# Patient Record
Sex: FEMALE | Race: WHITE | NOT HISPANIC OR LATINO | URBAN - METROPOLITAN AREA
[De-identification: names, ages, dates, MRNs, and addresses within clinical notes are randomized per-mention and may not be internally consistent; named-entity substitution may affect disease eponyms.]

---

## 2017-05-11 ENCOUNTER — APPOINTMENT (OUTPATIENT)
Dept: URBAN - METROPOLITAN AREA CLINIC 200 | Age: 62
Setting detail: DERMATOLOGY
End: 2017-05-17

## 2017-05-11 DIAGNOSIS — L82.0 INFLAMED SEBORRHEIC KERATOSIS: ICD-10-CM

## 2017-05-11 DIAGNOSIS — L57.8 OTHER SKIN CHANGES DUE TO CHRONIC EXPOSURE TO NONIONIZING RADIATION: ICD-10-CM

## 2017-05-11 PROCEDURE — 99213 OFFICE O/P EST LOW 20 MIN: CPT

## 2017-05-11 PROCEDURE — OTHER COUNSELING: OTHER

## 2017-05-11 ASSESSMENT — LOCATION SIMPLE DESCRIPTION DERM
LOCATION SIMPLE: RIGHT SHOULDER
LOCATION SIMPLE: RIGHT UPPER BACK

## 2017-05-11 ASSESSMENT — LOCATION ZONE DERM
LOCATION ZONE: ARM
LOCATION ZONE: TRUNK

## 2017-05-11 ASSESSMENT — LOCATION DETAILED DESCRIPTION DERM
LOCATION DETAILED: RIGHT ANTERIOR SHOULDER
LOCATION DETAILED: RIGHT MEDIAL UPPER BACK

## 2018-05-15 ENCOUNTER — APPOINTMENT (OUTPATIENT)
Dept: URBAN - METROPOLITAN AREA CLINIC 200 | Age: 63
Setting detail: DERMATOLOGY
End: 2018-05-18

## 2018-05-15 DIAGNOSIS — L57.8 OTHER SKIN CHANGES DUE TO CHRONIC EXPOSURE TO NONIONIZING RADIATION: ICD-10-CM

## 2018-05-15 DIAGNOSIS — L81.1 CHLOASMA: ICD-10-CM

## 2018-05-15 PROCEDURE — 99213 OFFICE O/P EST LOW 20 MIN: CPT

## 2018-05-15 PROCEDURE — OTHER PRESCRIPTION: OTHER

## 2018-05-15 PROCEDURE — OTHER COUNSELING: OTHER

## 2018-05-15 RX ORDER — HYDROQUINONE 4 %
CREAM (GRAM) TOPICAL
Qty: 1 | Refills: 6

## 2018-05-15 ASSESSMENT — LOCATION SIMPLE DESCRIPTION DERM
LOCATION SIMPLE: LEFT LIP
LOCATION SIMPLE: CHEST

## 2018-05-15 ASSESSMENT — LOCATION ZONE DERM
LOCATION ZONE: LIP
LOCATION ZONE: TRUNK

## 2018-05-15 ASSESSMENT — LOCATION DETAILED DESCRIPTION DERM
LOCATION DETAILED: LEFT MEDIAL SUPERIOR CHEST
LOCATION DETAILED: LEFT LOWER CUTANEOUS LIP

## 2018-05-15 ASSESSMENT — SEVERITY ASSESSMENT: SEVERITY: MODERATE, MODERATELY DARKER THAN THE SURROUNDING NORMAL SKIN

## 2018-08-06 ENCOUNTER — TRANSCRIBE ORDERS (OUTPATIENT)
Dept: RADIOLOGY | Age: 63
End: 2018-08-06

## 2018-08-06 ENCOUNTER — HOSPITAL ENCOUNTER (OUTPATIENT)
Dept: RADIOLOGY | Age: 63
Discharge: HOME | End: 2018-08-06
Attending: INTERNAL MEDICINE
Payer: COMMERCIAL

## 2018-08-06 DIAGNOSIS — Z12.31 ENCOUNTER FOR SCREENING MAMMOGRAM FOR MALIGNANT NEOPLASM OF BREAST: Primary | ICD-10-CM

## 2018-08-06 DIAGNOSIS — Z12.31 ENCOUNTER FOR SCREENING MAMMOGRAM FOR MALIGNANT NEOPLASM OF BREAST: ICD-10-CM

## 2018-08-06 PROCEDURE — 77063 BREAST TOMOSYNTHESIS BI: CPT

## 2018-09-17 ENCOUNTER — OFFICE VISIT (OUTPATIENT)
Dept: ENDOCRINOLOGY | Facility: CLINIC | Age: 63
End: 2018-09-17
Payer: COMMERCIAL

## 2018-09-17 VITALS
HEART RATE: 70 BPM | SYSTOLIC BLOOD PRESSURE: 118 MMHG | DIASTOLIC BLOOD PRESSURE: 84 MMHG | BODY MASS INDEX: 33.97 KG/M2 | HEIGHT: 56 IN | RESPIRATION RATE: 16 BRPM | WEIGHT: 151 LBS

## 2018-09-17 DIAGNOSIS — E11.9 DIABETES MELLITUS, STABLE (CMS/HCC): Primary | ICD-10-CM

## 2018-09-17 DIAGNOSIS — E11.9 TYPE 2 DIABETES MELLITUS WITHOUT COMPLICATION, WITHOUT LONG-TERM CURRENT USE OF INSULIN (CMS/HCC): ICD-10-CM

## 2018-09-17 DIAGNOSIS — E66.09 CLASS 1 OBESITY DUE TO EXCESS CALORIES WITHOUT SERIOUS COMORBIDITY IN ADULT, UNSPECIFIED BMI: ICD-10-CM

## 2018-09-17 DIAGNOSIS — E66.811 CLASS 1 OBESITY DUE TO EXCESS CALORIES WITHOUT SERIOUS COMORBIDITY IN ADULT, UNSPECIFIED BMI: ICD-10-CM

## 2018-09-17 PROBLEM — J45.20 MILD INTERMITTENT ASTHMA WITHOUT COMPLICATION: Status: ACTIVE | Noted: 2018-09-17

## 2018-09-17 PROCEDURE — 99245 OFF/OP CONSLTJ NEW/EST HI 55: CPT | Performed by: NURSE PRACTITIONER

## 2018-09-17 RX ORDER — ACETAMINOPHEN 500 MG
5000 TABLET ORAL DAILY
COMMUNITY

## 2018-09-17 RX ORDER — IBUPROFEN 200 MG
16 TABLET ORAL AS NEEDED
COMMUNITY

## 2018-09-17 RX ORDER — METFORMIN HYDROCHLORIDE 500 MG/1
500 TABLET ORAL
COMMUNITY
Start: 2015-08-03 | End: 2018-12-18 | Stop reason: SDUPTHER

## 2018-09-17 RX ORDER — RIBOFLAVIN (VITAMIN B2) 100 MG
TABLET ORAL DAILY
COMMUNITY

## 2018-09-17 RX ORDER — LISINOPRIL 2.5 MG/1
1 TABLET ORAL
COMMUNITY
Start: 2018-02-10 | End: 2019-07-25 | Stop reason: SDUPTHER

## 2018-09-17 NOTE — PROGRESS NOTES
Endocrinology  Note       PATIENTS: Edd Hammond  YOB: 1955  DATE: September 17, 2018  VISIT TYPE: follow up visit    History of Present Illness:   HPI 63 year old white female with type 2 diabetes since 2011.  She comes to the practice for diabetes management.  She states on diagnoses her A1c was 7.1.  She was placed on Metformin.  The patient was successful in losing 50 pounds in 2015 though she states she has regained 15 pounds.  She is currently on metformin 500 mg 2 tabs daily.  Her fasting blood sugars are .  She reports no hypoglycemia.  She does have a past medical history of sleep apnea she cannot use her CPAP asthma and  hypertension.  She does not smoke she is a  she works from her home she is  with no children no pregnancies.  Her past surgical history includes bilateral carpal tunnel release left shoulder repair colonoscopy she is now working with orthopedics for right shoulder discomfort.  She was last seen by ophthalmology in 2018 and podiatry most recently.    Medical History:  Past Medical History:   Diagnosis Date   • Arthritis    • Asthma    • Diverticulitis of colon    • High cholesterol    • Hypertension    • Type 2 diabetes mellitus (CMS/HCC) ([facility])        Surgical History:   History reviewed. No pertinent surgical history.    Family History:  Family History   Problem Relation Age of Onset   • COPD Mother    • Lung cancer Father        Social history:  Social History     Social History   • Marital status:      Spouse name: N/A   • Number of children: N/A   • Years of education: N/A     Occupational History   • Not on file.     Social History Main Topics   • Smoking status: Never Smoker   • Smokeless tobacco: Never Used   • Alcohol use Yes   • Drug use: No   • Sexual activity: Not on file     Other Topics Concern   • Not on file     Social History Narrative   • No narrative on file       Medication(active prior to  "today):  Current Outpatient Prescriptions   Medication Sig Dispense Refill   • cholecalciferol, vitamin D3, 5,000 unit tablet Take 5,000 Units by mouth daily.     • glucose 4 gram chewable tablet Take 16 g by mouth as needed for low blood sugar.     • lisinopril (PRINIVIL) 2.5 mg tablet Take 1 tablet by mouth.     • metFORMIN (GLUCOPHAGE) 500 mg tablet 500 mg.     • multivitamin tablet Take 1 tablet by mouth daily.     • riboflavin, vitamin B2, 100 mg tablet Take by mouth daily.       No current facility-administered medications for this visit.        Allergies:  Erythromycin    Review of Systems:   Constitutional: Negative for fatigue and unexpected weight change. + weight gain   HENT: Negative for postnasal drip, trouble swallowing and voice change.    Eyes: Negative for visual disturbance.   Respiratory: +  for apnea.  + asthma   Cardiovascular: Negative for leg swelling.   Gastrointestinal: +  for constipation, diarrhea and nausea.   Endocrine: Negative for polydipsia, polyphagia and polyuria.   Musculoskeletal: +  for arthralgias, back pain, neck pain and neck stiffness.   Neurological: Negative for numbness and headaches. + sinus headaches   Psychiatric/Behavioral: Negative for sleep disturbance. + depression     Vitals Signs:  Vitals:    09/17/18 1343   BP: 118/84   Pulse: 70   Resp: 16   Weight: 68.5 kg (151 lb)   Height: 1.422 m (4' 8\")     Body mass index is 33.85 kg/m².      Physical Exam:  Constitutional: Patient is oriented to person, place, and time.  Appears well-developed and well-nourished.   Eyes: EOM are normal. Pupils are equal, round, and reactive to light.   Neck: Normal range of motion. Neck supple.   Cardiovascular: Normal rate, regular rhythm and normal heart sounds.    Pulmonary/Chest: Effort normal and breath sounds normal.   Abdominal: Soft.   Musculoskeletal: Normal range of motion.   Neurological:  Alert and oriented to person, place, and time.   Skin: Skin is warm and dry. "   Psychiatric:  Normal mood and affect. Behavior is normal. Judgment and thought content normal.   Foot Exam: Normal exam, no swelling, tenderness, instability; ligaments intact, full range of motion of all ankle/foot joints    Assessment/Plan:  Diabetes mellitus, stable (CMS/HCC) ([facility])  1.  A1c6.3  2. Exercise goal 30 min 5 days per week   3. Only diet drinks/water   4. Call me for any steroids  5. Continue with Metformin  mg per day   6. Bring glucometer next visit   7. Based on labs dertermin statin   8. See dietician     Obesity due to excess calories without serious comorbidity  Goal 10, 000 steps per day   Only diet drinks       Labs:  Lab Results   Component Value Date    WBC 6.91 09/01/2016    HGB 12.2 09/01/2016    HCT 35.8 09/01/2016     09/01/2016    ALT 18 09/01/2016    AST 21 09/01/2016     09/01/2016    K 4.0 09/01/2016     09/01/2016    CREATININE 0.9 09/01/2016    BUN 18 09/01/2016    CO2 28 09/01/2016       Total encounter time 60 minutes greater than 50% spent counseling/coordination of care.  I reviewed the patient's blood sugars.  I discussed the goals of diet and exercise with the patient. I discussed the goals of A1c, blood pressure, and cholesterol. I discussed the goals of blood sugar pre- and post meals with the patient.  I reviewed the patient's labs/imaging/medications/allergies/problem list.       Electronically signed by: JUSTO Yarbrough on 9/17/2018 4:45 PM

## 2018-09-17 NOTE — ASSESSMENT & PLAN NOTE
1.  A1c6.3  2. Exercise goal 30 min 5 days per week   3. Only diet drinks/water   4. Call me for any steroids  5. Continue with Metformin  mg per day   6. Bring glucometer next visit   7. Based on labs dertermin statin   8. See dietician

## 2018-09-17 NOTE — PATIENT INSTRUCTIONS
Diabetes mellitus, stable (CMS/[facility]) ([facility])  1.  A1c6.3  2. Exercise goal 30 min 5 days per week   3. Only diet drinks/water   4. Call me for any steroids  5. Continue with Metformin  mg per day   6. Bring glucometer next visit   7. Based on labs dertermin statin   8. See dietician     Obesity due to excess calories without serious comorbidity  Goal 10, 000 steps per day   Only diet drinks     Patient Education     Diabetes Mellitus and Exercise  Exercising regularly is important for your overall health, especially when you have diabetes (diabetes mellitus). Exercising is not only about losing weight. It has many health benefits, such as increasing muscle strength and bone density and reducing body fat and stress. This leads to improved fitness, flexibility, and endurance, all of which result in better overall health.  Exercise has additional benefits for people with diabetes, including:  · Reducing appetite.  · Helping to lower and control blood glucose.  · Lowering blood pressure.  · Helping to control amounts of fatty substances (lipids) in the blood, such as cholesterol and triglycerides.  · Helping the body to respond better to insulin (improving insulin sensitivity).  · Reducing how much insulin the body needs.  · Decreasing the risk for heart disease by:  ¨ Lowering cholesterol and triglyceride levels.  ¨ Increasing the levels of good cholesterol.  ¨ Lowering blood glucose levels.  What is my activity plan?  Your health care provider or certified diabetes educator can help you make a plan for the type and frequency of exercise (activity plan) that works for you. Make sure that you:  · Do at least 150 minutes of moderate-intensity or vigorous-intensity exercise each week. This could be brisk walking, biking, or water aerobics.  ¨ Do stretching and strength exercises, such as yoga or weightlifting, at least 2 times a week.  ¨ Spread out your activity over at least 3 days of the week.  · Get some form of  physical activity every day.  ¨ Do not go more than 2 days in a row without some kind of physical activity.  ¨ Avoid being inactive for more than 90 minutes at a time. Take frequent breaks to walk or stretch.  · Choose a type of exercise or activity that you enjoy, and set realistic goals.  · Start slowly, and gradually increase the intensity of your exercise over time.  What do I need to know about managing my diabetes?  · Check your blood glucose before and after exercising.  ¨ If your blood glucose is higher than 240 mg/dL (13.3 mmol/L) before you exercise, check your urine for ketones. If you have ketones in your urine, do not exercise until your blood glucose returns to normal.  · Know the symptoms of low blood glucose (hypoglycemia) and how to treat it. Your risk for hypoglycemia increases during and after exercise. Common symptoms of hypoglycemia can include:  ¨ Hunger.  ¨ Anxiety.  ¨ Sweating and feeling clammy.  ¨ Confusion.  ¨ Dizziness or feeling light-headed.  ¨ Increased heart rate or palpitations.  ¨ Blurry vision.  ¨ Tingling or numbness around the mouth, lips, or tongue.  ¨ Tremors or shakes.  ¨ Irritability.  · Keep a rapid-acting carbohydrate snack available before, during, and after exercise to help prevent or treat hypoglycemia.  · Avoid injecting insulin into areas of the body that are going to be exercised. For example, avoid injecting insulin into:  ¨ The arms, when playing tennis.  ¨ The legs, when jogging.  · Keep records of your exercise habits. Doing this can help you and your health care provider adjust your diabetes management plan as needed. Write down:  ¨ Food that you eat before and after you exercise.  ¨ Blood glucose levels before and after you exercise.  ¨ The type and amount of exercise you have done.  ¨ When your insulin is expected to peak, if you use insulin. Avoid exercising at times when your insulin is peaking.  · When you start a new exercise or activity, work with your  health care provider to make sure the activity is safe for you, and to adjust your insulin, medicines, or food intake as needed.  · Drink plenty of water while you exercise to prevent dehydration or heat stroke. Drink enough fluid to keep your urine clear or pale yellow.  This information is not intended to replace advice given to you by your health care provider. Make sure you discuss any questions you have with your health care provider.  Document Released: 03/09/2005 Document Revised: 07/07/2017 Document Reviewed: 05/29/2017  [employer] Interactive Patient Education © 2018 [employer]

## 2018-12-04 LAB
ALBUMIN SERPL-MCNC: 4.5 G/DL (ref 3.6–4.8)
ALBUMIN/CREAT UR: 27.8 MG/G CREAT (ref 0–30)
ALBUMIN/GLOB SERPL: 1.9 {RATIO} (ref 1.2–2.2)
ALP SERPL-CCNC: 83 IU/L (ref 39–117)
ALT SERPL-CCNC: 15 IU/L (ref 0–32)
AST SERPL-CCNC: 15 IU/L (ref 0–40)
BILIRUB SERPL-MCNC: 0.3 MG/DL (ref 0–1.2)
BUN SERPL-MCNC: 17 MG/DL (ref 8–27)
BUN/CREAT SERPL: 20 (ref 12–28)
CALCIUM SERPL-MCNC: 9.2 MG/DL (ref 8.7–10.3)
CHLORIDE SERPL-SCNC: 100 MMOL/L (ref 96–106)
CHOLEST SERPL-MCNC: 233 MG/DL (ref 100–199)
CO2 SERPL-SCNC: 25 MMOL/L (ref 20–29)
CREAT SERPL-MCNC: 0.87 MG/DL (ref 0.57–1)
CREAT UR-MCNC: 113.2 MG/DL
GLOBULIN SER CALC-MCNC: 2.4 G/DL (ref 1.5–4.5)
GLUCOSE SERPL-MCNC: 117 MG/DL (ref 65–99)
HBA1C MFR BLD: 6.3 % (ref 4.8–5.6)
HDLC SERPL-MCNC: 53 MG/DL
LAB CORP EGFR IF AFRICN AM: 82 ML/MIN/1.73
LAB CORP EGFR IF NONAFRICN AM: 71 ML/MIN/1.73
LDLC SERPL CALC-MCNC: 138 MG/DL (ref 0–99)
MICROALBUMIN UR-MCNC: 31.5 UG/ML
POTASSIUM SERPL-SCNC: 4.6 MMOL/L (ref 3.5–5.2)
PROT SERPL-MCNC: 6.9 G/DL (ref 6–8.5)
SODIUM SERPL-SCNC: 141 MMOL/L (ref 134–144)
T4 FREE SERPL-MCNC: 1.4 NG/DL (ref 0.82–1.77)
TRIGL SERPL-MCNC: 212 MG/DL (ref 0–149)
TSH SERPL DL<=0.005 MIU/L-ACNC: 2.13 UIU/ML (ref 0.45–4.5)
VLDLC SERPL CALC-MCNC: 42 MG/DL (ref 5–40)

## 2018-12-18 ENCOUNTER — OFFICE VISIT (OUTPATIENT)
Dept: ENDOCRINOLOGY | Facility: CLINIC | Age: 63
End: 2018-12-18
Payer: COMMERCIAL

## 2018-12-18 VITALS
BODY MASS INDEX: 34.55 KG/M2 | DIASTOLIC BLOOD PRESSURE: 82 MMHG | HEART RATE: 85 BPM | WEIGHT: 153.6 LBS | HEIGHT: 56 IN | SYSTOLIC BLOOD PRESSURE: 122 MMHG

## 2018-12-18 DIAGNOSIS — E11.9 DIABETES MELLITUS, STABLE (CMS/HCC): Primary | ICD-10-CM

## 2018-12-18 DIAGNOSIS — E78.49 OTHER HYPERLIPIDEMIA: ICD-10-CM

## 2018-12-18 PROCEDURE — 99214 OFFICE O/P EST MOD 30 MIN: CPT | Performed by: NURSE PRACTITIONER

## 2018-12-18 RX ORDER — BLOOD-GLUCOSE METER
1 KIT MISCELLANEOUS 4 TIMES DAILY
Qty: 200 STRIP | Refills: 11 | Status: SHIPPED | OUTPATIENT
Start: 2018-12-18 | End: 2019-12-31 | Stop reason: SDUPTHER

## 2018-12-18 RX ORDER — METFORMIN HYDROCHLORIDE 500 MG/1
500 TABLET ORAL 2 TIMES DAILY WITH MEALS
Qty: 60 TABLET | Refills: 3 | Status: SHIPPED | OUTPATIENT
Start: 2018-12-18 | End: 2019-07-25 | Stop reason: SDUPTHER

## 2018-12-18 NOTE — PATIENT INSTRUCTIONS
Diabetes mellitus, stable (CMS/[facility]) ([facility])  1. A1c 6.3  2. Continue with Metformin 500 mg 2 tabs daily   3. Continue with diet and exercise  4. Call me for any low blood sugars       Obesity due to excess calories without serious comorbidity  Continue with diet and exercise     Other hyperlipidemia  Based on labs may start statin     Patient Education     Diabetes Mellitus and Exercise  Exercising regularly is important for your overall health, especially when you have diabetes (diabetes mellitus). Exercising is not only about losing weight. It has many health benefits, such as increasing muscle strength and bone density and reducing body fat and stress. This leads to improved fitness, flexibility, and endurance, all of which result in better overall health.  Exercise has additional benefits for people with diabetes, including:  · Reducing appetite.  · Helping to lower and control blood glucose.  · Lowering blood pressure.  · Helping to control amounts of fatty substances (lipids) in the blood, such as cholesterol and triglycerides.  · Helping the body to respond better to insulin (improving insulin sensitivity).  · Reducing how much insulin the body needs.  · Decreasing the risk for heart disease by:  ¨ Lowering cholesterol and triglyceride levels.  ¨ Increasing the levels of good cholesterol.  ¨ Lowering blood glucose levels.  What is my activity plan?  Your health care provider or certified diabetes educator can help you make a plan for the type and frequency of exercise (activity plan) that works for you. Make sure that you:  · Do at least 150 minutes of moderate-intensity or vigorous-intensity exercise each week. This could be brisk walking, biking, or water aerobics.  ¨ Do stretching and strength exercises, such as yoga or weightlifting, at least 2 times a week.  ¨ Spread out your activity over at least 3 days of the week.  · Get some form of physical activity every day.  ¨ Do not go more than 2 days in a row  without some kind of physical activity.  ¨ Avoid being inactive for more than 90 minutes at a time. Take frequent breaks to walk or stretch.  · Choose a type of exercise or activity that you enjoy, and set realistic goals.  · Start slowly, and gradually increase the intensity of your exercise over time.  What do I need to know about managing my diabetes?  · Check your blood glucose before and after exercising.  ¨ If your blood glucose is higher than 240 mg/dL (13.3 mmol/L) before you exercise, check your urine for ketones. If you have ketones in your urine, do not exercise until your blood glucose returns to normal.  · Know the symptoms of low blood glucose (hypoglycemia) and how to treat it. Your risk for hypoglycemia increases during and after exercise. Common symptoms of hypoglycemia can include:  ¨ Hunger.  ¨ Anxiety.  ¨ Sweating and feeling clammy.  ¨ Confusion.  ¨ Dizziness or feeling light-headed.  ¨ Increased heart rate or palpitations.  ¨ Blurry vision.  ¨ Tingling or numbness around the mouth, lips, or tongue.  ¨ Tremors or shakes.  ¨ Irritability.  · Keep a rapid-acting carbohydrate snack available before, during, and after exercise to help prevent or treat hypoglycemia.  · Avoid injecting insulin into areas of the body that are going to be exercised. For example, avoid injecting insulin into:  ¨ The arms, when playing tennis.  ¨ The legs, when jogging.  · Keep records of your exercise habits. Doing this can help you and your health care provider adjust your diabetes management plan as needed. Write down:  ¨ Food that you eat before and after you exercise.  ¨ Blood glucose levels before and after you exercise.  ¨ The type and amount of exercise you have done.  ¨ When your insulin is expected to peak, if you use insulin. Avoid exercising at times when your insulin is peaking.  · When you start a new exercise or activity, work with your health care provider to make sure the activity is safe for you, and to  adjust your insulin, medicines, or food intake as needed.  · Drink plenty of water while you exercise to prevent dehydration or heat stroke. Drink enough fluid to keep your urine clear or pale yellow.  This information is not intended to replace advice given to you by your health care provider. Make sure you discuss any questions you have with your health care provider.  Document Released: 03/09/2005 Document Revised: 07/07/2017 Document Reviewed: 05/29/2017  [employer] Interactive Patient Education © 2018 [employer]

## 2018-12-18 NOTE — ASSESSMENT & PLAN NOTE
1. A1c 6.3  2. Continue with Metformin 500 mg 2 tabs daily   3. Continue with diet and exercise  4. Call me for any low blood sugars

## 2018-12-18 NOTE — PROGRESS NOTES
Endocrinology  Note       PATIENTS: Edd Hammond  YOB: 1955  DATE: December 18, 2018  VISIT TYPE: follow up visit    History of Present Illness:   HPI 63 year old white female with type 2 diabetes since 2011. She states she is upset not been able to lose weight recently she has been bothered by hip shoulder discomfort and will be meeting with orthopedist the end of this month     She is currently on metformin 500 mg 2 tabs daily.  Her fasting blood sugars are .  She reports no hypoglycemia.  She does have a past medical history of sleep apnea she cannot use her CPAP asthma and  hypertension.  She does not smoke she is a  she works from her home she is  with no children no pregnancies.  Her past surgical history includes bilateral carpal tunnel release left shoulder repair colonoscopy she is now working with orthopedics for right shoulder discomfort.  She was last seen by ophthalmology in 2018 and podiatry most recently.    Medical History:  Past Medical History:   Diagnosis Date   • Arthritis    • Asthma    • Diverticulitis of colon    • High cholesterol    • Hypertension    • Type 2 diabetes mellitus (CMS/HCC) ([facility])        Surgical History:   No past surgical history on file.    Family History:  Family History   Problem Relation Age of Onset   • COPD Mother    • Lung cancer Father        Social history:  Social History     Social History   • Marital status:      Spouse name: N/A   • Number of children: N/A   • Years of education: N/A     Occupational History   • Not on file.     Social History Main Topics   • Smoking status: Never Smoker   • Smokeless tobacco: Never Used   • Alcohol use Yes   • Drug use: No   • Sexual activity: Not on file     Other Topics Concern   • Not on file     Social History Narrative   • No narrative on file       Medication(active prior to today):  Current Outpatient Prescriptions   Medication Sig Dispense Refill   • RED  "YEAST RICE ORAL Take by mouth.     • cholecalciferol, vitamin D3, 5,000 unit tablet Take 5,000 Units by mouth daily.     • FREESTYLE LITE STRIPS strip 1 strip 4 (four) times a day. Dx code E 11.9 200 strip 11   • glucose 4 gram chewable tablet Take 16 g by mouth as needed for low blood sugar.     • lisinopril (PRINIVIL) 2.5 mg tablet Take 1 tablet by mouth.     • metFORMIN (GLUCOPHAGE) 500 mg tablet Take 1 tablet (500 mg total) by mouth 2 (two) times a day with meals. 60 tablet 3   • multivitamin tablet Take 1 tablet by mouth daily.     • riboflavin, vitamin B2, 100 mg tablet Take by mouth daily.       No current facility-administered medications for this visit.        Allergies:  Erythromycin    Review of Systems:   Constitutional: Negative for fatigue and unexpected weight change.   HENT: Negative for postnasal drip, trouble swallowing and voice change.    Eyes: Negative for visual disturbance.   Respiratory: Negative for apnea.    Cardiovascular: Negative for leg swelling.   Gastrointestinal: Negative for constipation, diarrhea and nausea.   Endocrine: Negative for polydipsia, polyphagia and polyuria.   Musculoskeletal: +  for arthralgias, back pain, neck pain and neck stiffness.   Neurological: Negative for numbness and headaches.   Psychiatric/Behavioral: Negative for sleep disturbance.     Vitals Signs:  Vitals:    12/18/18 1348   BP: 122/82   BP Location: Right upper arm   Patient Position: Sitting   Pulse: 85   Weight: 69.7 kg (153 lb 9.6 oz)   Height: 1.422 m (4' 8\")     Body mass index is 34.44 kg/m².      Physical Exam:  Constitutional: Patient is oriented to person, place, and time.  Appears well-developed and well-nourished. + overweight   Eyes: EOM are normal. Pupils are equal, round, and reactive to light.   Neck: Normal range of motion. Neck supple.   Cardiovascular: Normal rate, regular rhythm and normal heart sounds.    Pulmonary/Chest: Effort normal and breath sounds normal.   Abdominal: Soft. "   Musculoskeletal: Normal range of motion.   Neurological:  Alert and oriented to person, place, and time.   Skin: Skin is warm and dry.   Psychiatric:  Normal mood and affect. Behavior is normal. Judgment and thought content normal.   Foot Exam: Normal exam, no swelling, tenderness, instability; ligaments intact, full range of motion of all ankle/foot joints    Assessment/Plan:  Diabetes mellitus, stable (CMS/HCC) ([facility])  1. A1c 6.3  2. Continue with Metformin 500 mg 2 tabs daily   3. Continue with diet and exercise  4. Call me for any low blood sugars       Obesity due to excess calories without serious comorbidity  Continue with diet and exercise     Other hyperlipidemia  Based on labs may start statin       Labs:  Lab Results   Component Value Date    WBC 6.91 09/01/2016    HGB 12.2 09/01/2016    HCT 35.8 09/01/2016     09/01/2016    CHOL 233 (H) 12/03/2018    TRIG 212 (H) 12/03/2018    HDL 53 12/03/2018    ALT 15 12/03/2018    AST 15 12/03/2018     12/03/2018    K 4.6 12/03/2018     12/03/2018    CREATININE 0.87 12/03/2018    BUN 17 12/03/2018    CO2 25 12/03/2018    TSH 2.130 12/03/2018    HGBA1C 6.3 (H) 12/03/2018    MICROALBUR 31.5 12/03/2018       Total encounter time 25 minutes greater than 50% spent counseling/coordination of care.  I reviewed the patient's blood sugars.  I discussed the goals of diet and exercise with the patient. I discussed the goals of A1c, blood pressure, and cholesterol. I discussed the goals of blood sugar pre- and post meals with the patient.  I reviewed the patient's labs/imaging/medications/allergies/problem list.       Electronically signed by: JUSTO Yarbrough on 12/18/2018 4:26 PM

## 2019-01-30 ENCOUNTER — APPOINTMENT (EMERGENCY)
Dept: RADIOLOGY | Facility: HOSPITAL | Age: 64
End: 2019-01-30
Payer: COMMERCIAL

## 2019-01-30 ENCOUNTER — HOSPITAL ENCOUNTER (EMERGENCY)
Facility: HOSPITAL | Age: 64
Discharge: HOME | End: 2019-01-30
Attending: EMERGENCY MEDICINE
Payer: COMMERCIAL

## 2019-01-30 VITALS
SYSTOLIC BLOOD PRESSURE: 134 MMHG | DIASTOLIC BLOOD PRESSURE: 68 MMHG | TEMPERATURE: 99 F | HEIGHT: 56 IN | OXYGEN SATURATION: 98 % | BODY MASS INDEX: 33.74 KG/M2 | RESPIRATION RATE: 18 BRPM | HEART RATE: 88 BPM | WEIGHT: 150 LBS

## 2019-01-30 DIAGNOSIS — J45.901 PERSISTENT ASTHMA WITH ACUTE EXACERBATION, UNSPECIFIED ASTHMA SEVERITY: Primary | ICD-10-CM

## 2019-01-30 LAB
ALBUMIN SERPL-MCNC: 3.9 G/DL (ref 3.4–5)
ALP SERPL-CCNC: 58 IU/L (ref 35–126)
ALT SERPL-CCNC: 24 IU/L (ref 11–54)
ANION GAP SERPL CALC-SCNC: 10 MEQ/L (ref 3–15)
AST SERPL-CCNC: 26 IU/L (ref 15–41)
BASOPHILS # BLD: 0.01 K/UL (ref 0.01–0.1)
BASOPHILS NFR BLD: 0.2 %
BILIRUB SERPL-MCNC: 0.7 MG/DL (ref 0.3–1.2)
BUN SERPL-MCNC: 18 MG/DL (ref 8–20)
CALCIUM SERPL-MCNC: 8.6 MG/DL (ref 8.9–10.3)
CHLORIDE SERPL-SCNC: 97 MEQ/L (ref 98–109)
CO2 SERPL-SCNC: 26 MEQ/L (ref 22–32)
CREAT SERPL-MCNC: 0.8 MG/DL
DIFFERENTIAL METHOD BLD: NORMAL
EOSINOPHIL # BLD: 0.12 K/UL (ref 0.04–0.36)
EOSINOPHIL NFR BLD: 2 %
ERYTHROCYTE [DISTWIDTH] IN BLOOD BY AUTOMATED COUNT: 12.8 % (ref 11.7–14.4)
GFR SERPL CREATININE-BSD FRML MDRD: >60 ML/MIN/1.73M*2
GLUCOSE SERPL-MCNC: 115 MG/DL (ref 70–99)
HCT VFR BLDCO AUTO: 37.7 %
HGB BLD-MCNC: 12.7 G/DL
IMM GRANULOCYTES # BLD AUTO: 0.01 K/UL (ref 0–0.08)
IMM GRANULOCYTES NFR BLD AUTO: 0.2 %
LYMPHOCYTES # BLD: 1.76 K/UL (ref 1.2–3.5)
LYMPHOCYTES NFR BLD: 28.6 %
MCH RBC QN AUTO: 29.1 PG (ref 28–33.2)
MCHC RBC AUTO-ENTMCNC: 33.7 G/DL (ref 32.2–35.5)
MCV RBC AUTO: 86.5 FL (ref 83–98)
MONOCYTES # BLD: 0.67 K/UL (ref 0.28–0.8)
MONOCYTES NFR BLD: 10.9 %
NEUTROPHILS # BLD: 3.58 K/UL (ref 1.7–7)
NEUTS SEG NFR BLD: 58.1 %
NRBC BLD-RTO: 0 %
PDW BLD AUTO: 9.5 FL (ref 9.4–12.3)
PLATELET # BLD AUTO: 184 K/UL
POTASSIUM SERPL-SCNC: 3.6 MEQ/L (ref 3.6–5.1)
PROT SERPL-MCNC: 7.4 G/DL (ref 6–8.2)
RBC # BLD AUTO: 4.36 M/UL (ref 3.93–5.22)
SODIUM SERPL-SCNC: 133 MEQ/L (ref 136–144)
TROPONIN I SERPL-MCNC: <0.02 NG/ML
WBC # BLD AUTO: 6.15 K/UL

## 2019-01-30 PROCEDURE — 84484 ASSAY OF TROPONIN QUANT: CPT | Performed by: PHYSICIAN ASSISTANT

## 2019-01-30 PROCEDURE — 63700000 HC SELF-ADMINISTRABLE DRUG: Performed by: PHYSICIAN ASSISTANT

## 2019-01-30 PROCEDURE — 36415 COLL VENOUS BLD VENIPUNCTURE: CPT | Performed by: PHYSICIAN ASSISTANT

## 2019-01-30 PROCEDURE — 85025 COMPLETE CBC W/AUTO DIFF WBC: CPT | Performed by: PHYSICIAN ASSISTANT

## 2019-01-30 PROCEDURE — 71046 X-RAY EXAM CHEST 2 VIEWS: CPT

## 2019-01-30 PROCEDURE — 80053 COMPREHEN METABOLIC PANEL: CPT | Performed by: PHYSICIAN ASSISTANT

## 2019-01-30 PROCEDURE — 99284 EMERGENCY DEPT VISIT MOD MDM: CPT | Mod: 25

## 2019-01-30 PROCEDURE — 25000000 HC PHARMACY GENERAL: Performed by: PHYSICIAN ASSISTANT

## 2019-01-30 PROCEDURE — 25800000 HC PHARMACY IV SOLUTIONS: Performed by: PHYSICIAN ASSISTANT

## 2019-01-30 PROCEDURE — 93005 ELECTROCARDIOGRAM TRACING: CPT | Performed by: PHYSICIAN ASSISTANT

## 2019-01-30 RX ORDER — PREDNISONE 20 MG/1
60 TABLET ORAL ONCE
Status: COMPLETED | OUTPATIENT
Start: 2019-01-30 | End: 2019-01-30

## 2019-01-30 RX ORDER — PREDNISONE 10 MG/1
TABLET ORAL
Qty: 30 TABLET | Refills: 0 | Status: SHIPPED | OUTPATIENT
Start: 2019-01-30 | End: 2019-03-20 | Stop reason: ALTCHOICE

## 2019-01-30 RX ORDER — IPRATROPIUM BROMIDE AND ALBUTEROL SULFATE 2.5; .5 MG/3ML; MG/3ML
3 SOLUTION RESPIRATORY (INHALATION) ONCE
Status: COMPLETED | OUTPATIENT
Start: 2019-01-30 | End: 2019-01-30

## 2019-01-30 RX ORDER — AMOXICILLIN AND CLAVULANATE POTASSIUM 500; 125 MG/1; MG/1
1 TABLET, FILM COATED ORAL 3 TIMES DAILY
COMMUNITY
End: 2019-03-20 | Stop reason: ALTCHOICE

## 2019-01-30 RX ADMIN — PREDNISONE 60 MG: 20 TABLET ORAL at 18:41

## 2019-01-30 RX ADMIN — SODIUM CHLORIDE 500 ML: 9 INJECTION, SOLUTION INTRAVENOUS at 18:36

## 2019-01-30 RX ADMIN — IPRATROPIUM BROMIDE AND ALBUTEROL SULFATE 3 ML: 2.5; .5 SOLUTION RESPIRATORY (INHALATION) at 18:46

## 2019-01-30 ASSESSMENT — ENCOUNTER SYMPTOMS
ABDOMINAL PAIN: 0
VOMITING: 0
APPETITE CHANGE: 0
SORE THROAT: 0
COUGH: 1
DYSURIA: 0
EYE PAIN: 0
WEAKNESS: 0
HEMATURIA: 0
FREQUENCY: 0
NUMBNESS: 0
JOINT SWELLING: 0
FEVER: 1
SINUS PRESSURE: 0
NECK PAIN: 0
DIARRHEA: 1
CHILLS: 0
NAUSEA: 1
RHINORRHEA: 0
BACK PAIN: 0
HEADACHES: 1
CHEST TIGHTNESS: 1
LIGHT-HEADEDNESS: 0
SHORTNESS OF BREATH: 1
DIZZINESS: 0

## 2019-01-30 NOTE — ED PROVIDER NOTES
Pt received in REU from WALESKA Hines PA-C    Awaiting labs and CXR. Will continue to monitor.   6:01 PM     CXR negative, labs WNL. Pt states she is feeling ok and is comfortable with discharge - has f/u with her allergist (who manages her asthma) at 930am tomorrow on 1/31/19. Will continue augmentin and prednisone. The pt is in agreement with the discharge plan at this time. All questions answered appropriately, discussed reasons to return to the ED as well.  7:21 PM      Freda Schaffer PA C  01/30/19 2341

## 2019-01-30 NOTE — ED PROVIDER NOTES
HPI     Chief Complaint   Patient presents with   • Cough       62 y/o F with history of asthma presents to the ED for evaluation of cough x 1 week ago.  Cough is productive of yellow mucus. Notes associated conversation dyspnea and chest tightness with coughing.  Admits to intermittent fevers with a T max 101 F.  Patient has been using rescue inhaler at home without relief.  Pt also complains of headache, nausea, and diarrhea x 2 days ago. Pt was evaluated by PCP 2 days ago of which and started on Augmentin for sinusitis with improvement of facial pain/ congestion.  Pt was flu swabbed at that time which was negative.  Pt made an appt with pulmonologist Dr Yañez tomorrow.    Denies abdominal pain, dizziness, lightheadedness, palpitation, weakness.                History provided by:  Patient   used: No         Patient History     Past Medical History:   Diagnosis Date   • Arthritis    • Asthma    • Diverticulitis of colon    • High cholesterol    • Hypertension    • Type 2 diabetes mellitus (CMS/HCC) (HCC)        History reviewed. No pertinent surgical history.    Family History   Problem Relation Age of Onset   • COPD Mother    • Lung cancer Father        Social History   Substance Use Topics   • Smoking status: Never Smoker   • Smokeless tobacco: Never Used   • Alcohol use Yes       Systems Reviewed from Nursing Triage:          Review of Systems     Review of Systems   Constitutional: Positive for fever. Negative for appetite change and chills.   HENT: Negative for congestion, ear pain, rhinorrhea, sinus pressure and sore throat.    Eyes: Negative for pain.   Respiratory: Positive for cough, chest tightness and shortness of breath.    Cardiovascular: Negative for chest pain and leg swelling.   Gastrointestinal: Positive for diarrhea and nausea. Negative for abdominal pain and vomiting.   Genitourinary: Negative for dysuria, frequency and hematuria.   Musculoskeletal: Negative for back pain,  joint swelling and neck pain.   Skin: Negative for rash.   Neurological: Positive for headaches. Negative for dizziness, weakness, light-headedness and numbness.        Physical Exam     ED Triage Vitals   Temp Pulse Resp BP SpO2   -- -- -- -- --      Temp src Heart Rate Source Patient Position BP Location FiO2 (%) (Set)   -- -- -- -- --                     No data found.          Physical Exam   Constitutional: She is oriented to person, place, and time. She appears well-developed and well-nourished.   Coarse cough noted during exam   HENT:   Head: Normocephalic and atraumatic.   Nose: Nose normal.   Mouth/Throat: Oropharynx is clear and moist.   Eyes: Conjunctivae and EOM are normal. Pupils are equal, round, and reactive to light.   Neck: Normal range of motion. Neck supple.   Cardiovascular: Normal rate, regular rhythm, normal heart sounds and intact distal pulses.    Pulmonary/Chest: Effort normal. She has decreased breath sounds in the right lower field. She has rhonchi in the right upper field, the right middle field, the left upper field and the left middle field.   Abdominal: Soft. Bowel sounds are normal. There is no tenderness.   Musculoskeletal: Normal range of motion. She exhibits no edema.   Lymphadenopathy:     She has no cervical adenopathy.   Neurological: She is alert and oriented to person, place, and time.   Skin: Skin is warm and dry. Capillary refill takes less than 2 seconds.   Psychiatric: She has a normal mood and affect.   Nursing note and vitals reviewed.           Procedures    ED Course & MDM     Labs Reviewed - No data to display    No orders to display               MDM         ED Course as of Feb 01 1347 Wed Jan 30, 2019   1737 Impression: cough; dyspnea  Plan: labs; ekg; cxr; neb; steroids; observe  REU complete  [MM]      ED Course User Index  [MM] Jackeline Hines PA C         Clinical Impressions as of Feb 01 1347   Persistent asthma with acute exacerbation, unspecified asthma  severity      By signing my name below, I, Lady Hensley, attest that this documentation has been prepared under the direction and in the presence of Jackeline Hines PA-C    1/30/2019 5:18 PM      I, Jackeline Hines, personally performed the services described in this documentation, as documented by the scribe in my presence, and it is both accurate and complete.  2/1/2019 1:49 PM         Lady Hensley  01/30/19 1737       Jackeline Hines PA C  02/01/19 2459

## 2019-01-31 LAB
ATRIAL RATE: 77
P AXIS: 32
PR INTERVAL: 146
QRS DURATION: 76
QT INTERVAL: 382
QTC CALCULATION(BAZETT): 432
R AXIS: 54
T WAVE AXIS: 40
VENTRICULAR RATE: 77

## 2019-01-31 NOTE — DISCHARGE INSTRUCTIONS
Return to the emergency department at any time for any worsening symptoms.  Follow up with your primary care provider for re-evaluation in 1-2 days. Follow up with your allergist as scheduled tomorrow 1/31/19 at 930am. Continue taking all home medications as prescribed. Start taking prednisone as prescribed to help with your asthma symptoms.

## 2019-01-31 NOTE — ED ATTESTATION NOTE
-----------------------------------------------------------  ED Attending Note.  1/30/2019, 7:22 PM    I supervised the care provided by the PA  (Donny/Karime). We have discussed the case. I have reviewed their note and agree with the plan of treatment.    Edd Hammond is a 63 y.o. female with the following   Past Medical History:   Diagnosis Date   • Arthritis    • Asthma    • Diverticulitis of colon    • High cholesterol    • Hypertension    • Type 2 diabetes mellitus (CMS/HCC) ([facility])    ,   Patient Active Problem List   Diagnosis   • Diabetes mellitus, stable (CMS/HCC) ([facility])   • Mild intermittent asthma without complication   • Obesity due to excess calories without serious comorbidity   • Other hyperlipidemia    and History reviewed. No pertinent surgical history. who comes to the ED today with   Chief Complaint   Patient presents with   • Cough   h/o asthma, T2DM c/o Productive cough x 1 week with fever to 101F. Seen by PCP 2 days ago, Flu swab neg, and was rx'd Augmentin for sinus infection. Scheduled to see Pulmonologist tomorrow    RESULTS: LABS, IMAGING, EKG  Labs Reviewed   COMPREHENSIVE METABOLIC PANEL - Abnormal        Result Value    Sodium 133 (*)     Potassium 3.6      Chloride 97 (*)     CO2 26      BUN 18      Creatinine 0.8      Glucose 115 (*)     Calcium 8.6 (*)     AST (SGOT) 26      ALT (SGPT) 24      Alkaline Phosphatase 58      Total Protein 7.4      Albumin 3.9      Bilirubin, Total 0.7      eGFR >60.0      Anion Gap 10     TROPONIN I - Normal    Troponin I <0.02     CBC - Normal    WBC 6.15      RBC 4.36      Hemoglobin 12.7      Hematocrit 37.7      MCV 86.5      MCH 29.1      MCHC 33.7      RDW 12.8      Platelets 184      MPV 9.5     CBC AND DIFFERENTIAL    Narrative:     The following orders were created for panel order CBC and differential.  Procedure                               Abnormality         Status                     ---------                               -----------          ------                     CBC[[id number]]                           Normal              Final result               Diff Count[[id number]]                                        Final result                 Please view results for these tests on the individual orders.   DIFF COUNT    Differential Type Auto      nRBC 0.0      Immature Granulocytes 0.2      Neutrophils 58.1      Lymphocytes 28.6      Monocytes 10.9      Eosinophils 2.0      Basophils 0.2      Immature Granulocytes, Absolute 0.01      Neutrophils, Absolute 3.58      Lymphocytes, Absolute 1.76      Monocytes, Absolute 0.67      Eosinophils, Absolute 0.12      Basophils, Absolute 0.01       X-RAY CHEST 2 VIEWS   Final Result   IMPRESSION: No evidence of active cardiopulmonary disease.      COMPARISON: 9/1/2016 and 10/30/2015 two view chest studies.      COMMENT: PA and lateral views of the chest show the lungs are normally expanded   and clear.  Normal pulmonary vascularity.   No acute pleural abnormality.   Normal, stable cardiac size.   Unremarkable, stable hilar and mediastinal contours.   Unremarkable imaged upper abdomen, again noting cholecystectomy clips in the   right upper quadrant.   Mild diffuse spondylitic changes throughout the dorsal spine are similar to the   2015 study.       CLINICAL IMPRESSION  Final diagnoses:   [J45.901] Persistent asthma with acute exacerbation, unspecified asthma severity       -----------------------------  Edwige Hernández MD  1/30/2019 @ 7:22 PM  -----------------------------------------------------------     Edwige Hernández MD  01/31/19 1437

## 2019-03-15 LAB
ALBUMIN SERPL-MCNC: 4.5 G/DL (ref 3.6–4.8)
ALBUMIN/GLOB SERPL: 1.8 {RATIO} (ref 1.2–2.2)
ALP SERPL-CCNC: 68 IU/L (ref 39–117)
ALT SERPL-CCNC: 14 IU/L (ref 0–32)
AST SERPL-CCNC: 14 IU/L (ref 0–40)
BILIRUB SERPL-MCNC: 0.4 MG/DL (ref 0–1.2)
BUN SERPL-MCNC: 13 MG/DL (ref 8–27)
BUN/CREAT SERPL: 15 (ref 12–28)
CALCIUM SERPL-MCNC: 9 MG/DL (ref 8.7–10.3)
CHLORIDE SERPL-SCNC: 102 MMOL/L (ref 96–106)
CHOLEST SERPL-MCNC: 281 MG/DL (ref 100–199)
CO2 SERPL-SCNC: 26 MMOL/L (ref 20–29)
CREAT SERPL-MCNC: 0.85 MG/DL (ref 0.57–1)
GLOBULIN SER CALC-MCNC: 2.5 G/DL (ref 1.5–4.5)
GLUCOSE SERPL-MCNC: 122 MG/DL (ref 65–99)
HBA1C MFR BLD: 6.8 % (ref 4.8–5.6)
HDLC SERPL-MCNC: 56 MG/DL
LAB CORP EGFR IF AFRICN AM: 84 ML/MIN/1.73
LAB CORP EGFR IF NONAFRICN AM: 73 ML/MIN/1.73
LDLC SERPL CALC-MCNC: 188 MG/DL (ref 0–99)
POTASSIUM SERPL-SCNC: 4.5 MMOL/L (ref 3.5–5.2)
PROT SERPL-MCNC: 7 G/DL (ref 6–8.5)
SODIUM SERPL-SCNC: 143 MMOL/L (ref 134–144)
T4 FREE SERPL-MCNC: 1.4 NG/DL (ref 0.82–1.77)
TRIGL SERPL-MCNC: 187 MG/DL (ref 0–149)
TSH SERPL DL<=0.005 MIU/L-ACNC: 1.96 UIU/ML (ref 0.45–4.5)
VLDLC SERPL CALC-MCNC: 37 MG/DL (ref 5–40)

## 2019-03-19 NOTE — ASSESSMENT & PLAN NOTE
1. A1c 6.8  2. Continue with Metformin 500 mg 2 tabs daily   3. Continue with diet and exercise  4. Call me for any low blood sugars   5. Call me for any steroids

## 2019-03-19 NOTE — PROGRESS NOTES
Endocrinology  Note       PATIENTS: Edd Hammond  YOB: 1955  DATE: March 20, 2019  VISIT TYPE: follow up visit    History of Present Illness:   HPI  63 year old white female with type 2 diabetes since 2011. She states she is upset with the rise in her blood sugar and A1c she did have sinus infection was on antibiotics and 2 rounds of steroids.  She just got back from [place] she was there 2 weeks.    She is currently on metformin 500 mg 2 tabs daily.  Her fasting blood sugars are .  She reports no hypoglycemia.  She does have a past medical history of sleep apnea she cannot use her CPAP asthma and  hypertension.  She does not smoke she is a  she works from her home she is  with no children no pregnancies.  Her past surgical history includes bilateral carpal tunnel release left shoulder repair colonoscopy she is now working with orthopedics for right shoulder discomfort.  She was last seen by ophthalmology in 2018 and podiatry most recently.  She has been intolerant to statins and Zetia.     Medical History:  Past Medical History:   Diagnosis Date   • Arthritis    • Asthma    • Diverticulitis of colon    • High cholesterol    • Hypertension    • Type 2 diabetes mellitus (CMS/HCC) (HCC)        Surgical History:   No past surgical history on file.    Family History:  Family History   Problem Relation Age of Onset   • COPD Mother    • Lung cancer Father        Social history:  Social History     Social History   • Marital status:      Spouse name: N/A   • Number of children: N/A   • Years of education: N/A     Occupational History   • Not on file.     Social History Main Topics   • Smoking status: Never Smoker   • Smokeless tobacco: Never Used   • Alcohol use Yes   • Drug use: No   • Sexual activity: Not on file     Other Topics Concern   • Not on file     Social History Narrative   • No narrative on file       Medication(active prior to  "today):  Current Outpatient Prescriptions   Medication Sig Dispense Refill   • alirocumab 75 mg/mL pen injector Inject 75 mg under the skin every 14 (fourteen) days. 2 pen 3   • cholecalciferol, vitamin D3, 5,000 unit tablet Take 5,000 Units by mouth daily.     • FREESTYLE LITE STRIPS strip 1 strip 4 (four) times a day. Dx code E 11.9 200 strip 11   • glucose 4 gram chewable tablet Take 16 g by mouth as needed for low blood sugar.     • lisinopril (PRINIVIL) 2.5 mg tablet Take 1 tablet by mouth.     • multivitamin tablet Take 1 tablet by mouth daily.     • RED YEAST RICE ORAL Take by mouth.     • riboflavin, vitamin B2, 100 mg tablet Take by mouth daily.       No current facility-administered medications for this visit.        Allergies:  Erythromycin    Review of Systems:   Constitutional: Negative for fatigue and unexpected weight change.   HENT: Negative for postnasal drip, trouble swallowing and voice change.    Eyes: Negative for visual disturbance.   Respiratory:+  for apnea.    Cardiovascular: Negative for leg swelling.   Gastrointestinal: Negative for constipation, diarrhea and nausea.   Endocrine: Negative for polydipsia, polyphagia and polyuria.   Musculoskeletal: Negative for arthralgias, back pain, neck pain and neck stiffness.   Neurological: Negative for numbness and headaches.   Psychiatric/Behavioral: Negative for sleep disturbance. + stress     Vitals Signs:  Vitals:    03/20/19 1315   BP: 112/72   BP Location: Left upper arm   Patient Position: Sitting   Pulse: 87   Weight: 67.6 kg (149 lb)   Height: 1.422 m (4' 8\")     Body mass index is 33.41 kg/m².      Physical Exam:  Constitutional: Patient is oriented to person, place, and time.  Appears well-developed and well-nourished.   Eyes: EOM are normal. Pupils are equal, round, and reactive to light.   Neck: Normal range of motion. Neck supple.   Cardiovascular: Normal rate, regular rhythm and normal heart sounds.    Pulmonary/Chest: Effort normal " and breath sounds normal.   Abdominal: Soft.   Musculoskeletal: Normal range of motion.   Neurological:  Alert and oriented to person, place, and time.   Skin: Skin is warm and dry.   Psychiatric:  Normal mood and affect. Behavior is normal. Judgment and thought content normal.       Assessment/Plan:  Diabetes mellitus, stable (CMS/HCC) (HCC)  1. A1c 6.8  2. Continue with Metformin 500 mg 2 tabs daily   3. Continue with diet and exercise  4. Call me for any low blood sugars   5. Call me for any steroids     Obesity due to excess calories without serious comorbidity  Continue with diet and exercise     Other hyperlipidemia  Start Praluent 75 mg every 2 weeks       Labs:  Lab Results   Component Value Date    WBC 6.15 01/30/2019    HGB 12.7 01/30/2019    HCT 37.7 01/30/2019     01/30/2019    CHOL 281 (H) 03/14/2019    TRIG 187 (H) 03/14/2019    HDL 56 03/14/2019    ALT 14 03/14/2019    AST 14 03/14/2019     03/14/2019    K 4.5 03/14/2019     03/14/2019    CREATININE 0.85 03/14/2019    BUN 13 03/14/2019    CO2 26 03/14/2019    TSH 1.960 03/14/2019    HGBA1C 6.8 (H) 03/14/2019    MICROALBUR 31.5 12/03/2018       Total encounter time 25 minutes greater than 50% spent counseling/coordination of care.  I discussed the goals of diet and exercise with the patient. I discussed the goals of A1c, blood pressure, and cholesterol. I discussed the goals of blood sugar pre- and post meals with the patient.I encouraged the patient to rotate the injection sites.  I reviewed the patient's labs/imaging/medications/allergies/problem list.       Electronically signed by: JUSTO Yarbrough on 3/20/2019 2:25 PM

## 2019-03-20 ENCOUNTER — OFFICE VISIT (OUTPATIENT)
Dept: ENDOCRINOLOGY | Facility: CLINIC | Age: 64
End: 2019-03-20
Payer: COMMERCIAL

## 2019-03-20 VITALS
SYSTOLIC BLOOD PRESSURE: 112 MMHG | BODY MASS INDEX: 33.52 KG/M2 | HEART RATE: 87 BPM | HEIGHT: 56 IN | DIASTOLIC BLOOD PRESSURE: 72 MMHG | WEIGHT: 149 LBS

## 2019-03-20 DIAGNOSIS — J45.20 MILD INTERMITTENT ASTHMA WITHOUT COMPLICATION: ICD-10-CM

## 2019-03-20 DIAGNOSIS — E66.09 CLASS 1 OBESITY DUE TO EXCESS CALORIES WITHOUT SERIOUS COMORBIDITY IN ADULT, UNSPECIFIED BMI: ICD-10-CM

## 2019-03-20 DIAGNOSIS — E11.9 DIABETES MELLITUS, STABLE (CMS/HCC): Primary | ICD-10-CM

## 2019-03-20 DIAGNOSIS — E66.811 CLASS 1 OBESITY DUE TO EXCESS CALORIES WITHOUT SERIOUS COMORBIDITY IN ADULT, UNSPECIFIED BMI: ICD-10-CM

## 2019-03-20 DIAGNOSIS — E78.49 OTHER HYPERLIPIDEMIA: ICD-10-CM

## 2019-03-20 PROCEDURE — 99214 OFFICE O/P EST MOD 30 MIN: CPT | Performed by: NURSE PRACTITIONER

## 2019-03-20 NOTE — LETTER
March 20, 2019     Ewa Wang MD  [address] [address]  [facility] [address]    Patient: Edd Hammond   YOB: 1955   Date of Visit: 3/20/2019       Dear Dr. Wang:    Thank you for referring Edd Hammond to me for evaluation. Below are my notes for this consultation.    If you have questions, please do not hesitate to call me. I look forward to following your patient along with you.         Sincerely,        JUSTO Yarbrough        CC: No Recipients  Yamilex Aguilar CRNP  3/20/2019  2:25 PM  Sign at close encounter                        Endocrinology  Note       PATIENTS: Edd Hammond  YOB: 1955  DATE: March 20, 2019  VISIT TYPE: follow up visit    History of Present Illness:   HPI  63 year old white female with type 2 diabetes since 2011. She states she is upset with the rise in her blood sugar and A1c she did have sinus infection was on antibiotics and 2 rounds of steroids.  She just got back from [place] she was there 2 weeks.    She is currently on metformin 500 mg 2 tabs daily.  Her fasting blood sugars are .  She reports no hypoglycemia.  She does have a past medical history of sleep apnea she cannot use her CPAP asthma and  hypertension.  She does not smoke she is a  she works from her home she is  with no children no pregnancies.  Her past surgical history includes bilateral carpal tunnel release left shoulder repair colonoscopy she is now working with orthopedics for right shoulder discomfort.  She was last seen by ophthalmology in 2018 and podiatry most recently.  She has been intolerant to statins and Zetia.     Medical History:  Past Medical History:   Diagnosis Date   • Arthritis    • Asthma    • Diverticulitis of colon    • High cholesterol    • Hypertension    • Type 2 diabetes mellitus (CMS/HCC) (HCC)        Surgical History:   No past surgical history on file.    Family History:  Family History   Problem  Relation Age of Onset   • COPD Mother    • Lung cancer Father        Social history:  Social History     Social History   • Marital status:      Spouse name: N/A   • Number of children: N/A   • Years of education: N/A     Occupational History   • Not on file.     Social History Main Topics   • Smoking status: Never Smoker   • Smokeless tobacco: Never Used   • Alcohol use Yes   • Drug use: No   • Sexual activity: Not on file     Other Topics Concern   • Not on file     Social History Narrative   • No narrative on file       Medication(active prior to today):  Current Outpatient Prescriptions   Medication Sig Dispense Refill   • alirocumab 75 mg/mL pen injector Inject 75 mg under the skin every 14 (fourteen) days. 2 pen 3   • cholecalciferol, vitamin D3, 5,000 unit tablet Take 5,000 Units by mouth daily.     • FREESTYLE LITE STRIPS strip 1 strip 4 (four) times a day. Dx code E 11.9 200 strip 11   • glucose 4 gram chewable tablet Take 16 g by mouth as needed for low blood sugar.     • lisinopril (PRINIVIL) 2.5 mg tablet Take 1 tablet by mouth.     • multivitamin tablet Take 1 tablet by mouth daily.     • RED YEAST RICE ORAL Take by mouth.     • riboflavin, vitamin B2, 100 mg tablet Take by mouth daily.       No current facility-administered medications for this visit.        Allergies:  Erythromycin    Review of Systems:   Constitutional: Negative for fatigue and unexpected weight change.   HENT: Negative for postnasal drip, trouble swallowing and voice change.    Eyes: Negative for visual disturbance.   Respiratory:+  for apnea.    Cardiovascular: Negative for leg swelling.   Gastrointestinal: Negative for constipation, diarrhea and nausea.   Endocrine: Negative for polydipsia, polyphagia and polyuria.   Musculoskeletal: Negative for arthralgias, back pain, neck pain and neck stiffness.   Neurological: Negative for numbness and headaches.   Psychiatric/Behavioral: Negative for sleep disturbance. + stress  "    Vitals Signs:  Vitals:    03/20/19 1315   BP: 112/72   BP Location: Left upper arm   Patient Position: Sitting   Pulse: 87   Weight: 67.6 kg (149 lb)   Height: 1.422 m (4' 8\")     Body mass index is 33.41 kg/m².      Physical Exam:  Constitutional: Patient is oriented to person, place, and time.  Appears well-developed and well-nourished.   Eyes: EOM are normal. Pupils are equal, round, and reactive to light.   Neck: Normal range of motion. Neck supple.   Cardiovascular: Normal rate, regular rhythm and normal heart sounds.    Pulmonary/Chest: Effort normal and breath sounds normal.   Abdominal: Soft.   Musculoskeletal: Normal range of motion.   Neurological:  Alert and oriented to person, place, and time.   Skin: Skin is warm and dry.   Psychiatric:  Normal mood and affect. Behavior is normal. Judgment and thought content normal.       Assessment/Plan:  Diabetes mellitus, stable (CMS/HCC) ([facility])  1. A1c 6.8  2. Continue with Metformin 500 mg 2 tabs daily   3. Continue with diet and exercise  4. Call me for any low blood sugars   5. Call me for any steroids     Obesity due to excess calories without serious comorbidity  Continue with diet and exercise     Other hyperlipidemia  Start Praluent 75 mg every 2 weeks       Labs:  Lab Results   Component Value Date    WBC 6.15 01/30/2019    HGB 12.7 01/30/2019    HCT 37.7 01/30/2019     01/30/2019    CHOL 281 (H) 03/14/2019    TRIG 187 (H) 03/14/2019    HDL 56 03/14/2019    ALT 14 03/14/2019    AST 14 03/14/2019     03/14/2019    K 4.5 03/14/2019     03/14/2019    CREATININE 0.85 03/14/2019    BUN 13 03/14/2019    CO2 26 03/14/2019    TSH 1.960 03/14/2019    HGBA1C 6.8 (H) 03/14/2019    MICROALBUR 31.5 12/03/2018       Total encounter time 25 minutes greater than 50% spent counseling/coordination of care.  I discussed the goals of diet and exercise with the patient. I discussed the goals of A1c, blood pressure, and cholesterol. I discussed the goals of " blood sugar pre- and post meals with the patient.I encouraged the patient to rotate the injection sites.  I reviewed the patient's labs/imaging/medications/allergies/problem list.       Electronically signed by: JUSTO Yarbrough on 3/20/2019 2:25 PM

## 2019-03-20 NOTE — PATIENT INSTRUCTIONS
Diabetes mellitus, stable (CMS/[facility]) ([facility])  1. A1c 6.8  2. Continue with Metformin 500 mg 2 tabs daily   3. Continue with diet and exercise  4. Call me for any low blood sugars   5. Call me for any steroids     Obesity due to excess calories without serious comorbidity  Continue with diet and exercise     Other hyperlipidemia  Start Praluent 75 mg every 2 weeks       Patient Education     Diabetes Mellitus and Exercise  Exercising regularly is important for your overall health, especially when you have diabetes (diabetes mellitus). Exercising is not only about losing weight. It has many health benefits, such as increasing muscle strength and bone density and reducing body fat and stress. This leads to improved fitness, flexibility, and endurance, all of which result in better overall health.  Exercise has additional benefits for people with diabetes, including:  · Reducing appetite.  · Helping to lower and control blood glucose.  · Lowering blood pressure.  · Helping to control amounts of fatty substances (lipids) in the blood, such as cholesterol and triglycerides.  · Helping the body to respond better to insulin (improving insulin sensitivity).  · Reducing how much insulin the body needs.  · Decreasing the risk for heart disease by:  ¨ Lowering cholesterol and triglyceride levels.  ¨ Increasing the levels of good cholesterol.  ¨ Lowering blood glucose levels.  What is my activity plan?  Your health care provider or certified diabetes educator can help you make a plan for the type and frequency of exercise (activity plan) that works for you. Make sure that you:  · Do at least 150 minutes of moderate-intensity or vigorous-intensity exercise each week. This could be brisk walking, biking, or water aerobics.  ¨ Do stretching and strength exercises, such as yoga or weightlifting, at least 2 times a week.  ¨ Spread out your activity over at least 3 days of the week.  · Get some form of physical activity every day.  ¨ Do  not go more than 2 days in a row without some kind of physical activity.  ¨ Avoid being inactive for more than 90 minutes at a time. Take frequent breaks to walk or stretch.  · Choose a type of exercise or activity that you enjoy, and set realistic goals.  · Start slowly, and gradually increase the intensity of your exercise over time.  What do I need to know about managing my diabetes?  · Check your blood glucose before and after exercising.  ¨ If your blood glucose is higher than 240 mg/dL (13.3 mmol/L) before you exercise, check your urine for ketones. If you have ketones in your urine, do not exercise until your blood glucose returns to normal.  · Know the symptoms of low blood glucose (hypoglycemia) and how to treat it. Your risk for hypoglycemia increases during and after exercise. Common symptoms of hypoglycemia can include:  ¨ Hunger.  ¨ Anxiety.  ¨ Sweating and feeling clammy.  ¨ Confusion.  ¨ Dizziness or feeling light-headed.  ¨ Increased heart rate or palpitations.  ¨ Blurry vision.  ¨ Tingling or numbness around the mouth, lips, or tongue.  ¨ Tremors or shakes.  ¨ Irritability.  · Keep a rapid-acting carbohydrate snack available before, during, and after exercise to help prevent or treat hypoglycemia.  · Avoid injecting insulin into areas of the body that are going to be exercised. For example, avoid injecting insulin into:  ¨ The arms, when playing tennis.  ¨ The legs, when jogging.  · Keep records of your exercise habits. Doing this can help you and your health care provider adjust your diabetes management plan as needed. Write down:  ¨ Food that you eat before and after you exercise.  ¨ Blood glucose levels before and after you exercise.  ¨ The type and amount of exercise you have done.  ¨ When your insulin is expected to peak, if you use insulin. Avoid exercising at times when your insulin is peaking.  · When you start a new exercise or activity, work with your health care provider to make sure the  activity is safe for you, and to adjust your insulin, medicines, or food intake as needed.  · Drink plenty of water while you exercise to prevent dehydration or heat stroke. Drink enough fluid to keep your urine clear or pale yellow.  This information is not intended to replace advice given to you by your health care provider. Make sure you discuss any questions you have with your health care provider.  Document Released: 03/09/2005 Document Revised: 07/07/2017 Document Reviewed: 05/29/2017  [employer] Interactive Patient Education © 2018 [employer]

## 2019-05-17 ENCOUNTER — APPOINTMENT (OUTPATIENT)
Dept: URBAN - METROPOLITAN AREA CLINIC 200 | Age: 64
Setting detail: DERMATOLOGY
End: 2019-05-28

## 2019-05-17 DIAGNOSIS — L57.8 OTHER SKIN CHANGES DUE TO CHRONIC EXPOSURE TO NONIONIZING RADIATION: ICD-10-CM

## 2019-05-17 PROBLEM — D23.9 OTHER BENIGN NEOPLASM OF SKIN, UNSPECIFIED: Status: ACTIVE | Noted: 2019-05-17

## 2019-05-17 PROCEDURE — OTHER REASSURANCE: OTHER

## 2019-05-17 PROCEDURE — OTHER MIPS QUALITY: OTHER

## 2019-05-17 PROCEDURE — OTHER COUNSELING: OTHER

## 2019-05-17 PROCEDURE — 99213 OFFICE O/P EST LOW 20 MIN: CPT

## 2019-05-17 ASSESSMENT — LOCATION ZONE DERM: LOCATION ZONE: TRUNK

## 2019-05-17 ASSESSMENT — LOCATION SIMPLE DESCRIPTION DERM: LOCATION SIMPLE: CHEST

## 2019-05-17 ASSESSMENT — LOCATION DETAILED DESCRIPTION DERM: LOCATION DETAILED: LEFT MEDIAL SUPERIOR CHEST

## 2019-07-18 LAB
ALBUMIN SERPL-MCNC: 4.4 G/DL (ref 3.6–4.8)
ALBUMIN/GLOB SERPL: 1.7 {RATIO} (ref 1.2–2.2)
ALP SERPL-CCNC: 80 IU/L (ref 39–117)
ALT SERPL-CCNC: 9 IU/L (ref 0–32)
AST SERPL-CCNC: 13 IU/L (ref 0–40)
BILIRUB SERPL-MCNC: 0.3 MG/DL (ref 0–1.2)
BUN SERPL-MCNC: 14 MG/DL (ref 8–27)
BUN/CREAT SERPL: 15 (ref 12–28)
CALCIUM SERPL-MCNC: 9.5 MG/DL (ref 8.7–10.3)
CHLORIDE SERPL-SCNC: 102 MMOL/L (ref 96–106)
CHOLEST SERPL-MCNC: 176 MG/DL (ref 100–199)
CO2 SERPL-SCNC: 27 MMOL/L (ref 20–29)
CREAT SERPL-MCNC: 0.93 MG/DL (ref 0.57–1)
GLOBULIN SER CALC-MCNC: 2.6 G/DL (ref 1.5–4.5)
GLUCOSE SERPL-MCNC: 117 MG/DL (ref 65–99)
HBA1C MFR BLD: 6.7 % (ref 4.8–5.6)
HDLC SERPL-MCNC: 58 MG/DL
LAB CORP EGFR IF AFRICN AM: 75 ML/MIN/1.73
LAB CORP EGFR IF NONAFRICN AM: 65 ML/MIN/1.73
LDLC SERPL CALC-MCNC: 91 MG/DL (ref 0–99)
POTASSIUM SERPL-SCNC: 5.6 MMOL/L (ref 3.5–5.2)
PROT SERPL-MCNC: 7 G/DL (ref 6–8.5)
SODIUM SERPL-SCNC: 143 MMOL/L (ref 134–144)
T4 FREE SERPL-MCNC: 1.41 NG/DL (ref 0.82–1.77)
TRIGL SERPL-MCNC: 137 MG/DL (ref 0–149)
TSH SERPL DL<=0.005 MIU/L-ACNC: 3.05 UIU/ML (ref 0.45–4.5)
VLDLC SERPL CALC-MCNC: 27 MG/DL (ref 5–40)

## 2019-07-22 NOTE — ASSESSMENT & PLAN NOTE
1. A1c 6.7  2. Continue with Metformin 500 mg 2 tabs daily   3. Continue with diet and exercise  4. Call me for any low blood sugars   5. Call me for any steroids

## 2019-07-22 NOTE — PROGRESS NOTES
Endocrinology  Note       PATIENTS: Edd Hammond  YOB: 1955  DATE: July 25, 2019  VISIT TYPE: follow up visit    History of Present Illness:   HPI 63 year old white female with type 2 diabetes since 2011.    She is currently on metformin 500 mg 2 tabs daily.  Her fasting blood sugars are .  She reports no hypoglycemia.  She does have a past medical history of sleep apnea she cannot use her CPAP asthma and  hypertension.  She does not smoke she is a  she works from her home she is  with no children no pregnancies.  Her past surgical history includes bilateral carpal tunnel release left shoulder repair colonoscopy she is now working with orthopedics for right shoulder discomfort.  She was last seen by ophthalmology in 2018 and podiatry most recently.  She has been intolerant to statins and Zetia has been on Praluent 140 mg every 2 weeks for the past 3 months.  She thinks she is having some side effects from Praluent  she does have tiredness muscle weakness.    Medical History:  Past Medical History:   Diagnosis Date   • Arthritis    • Asthma    • Diverticulitis of colon    • High cholesterol    • Hypertension    • Type 2 diabetes mellitus (CMS/HCC) (HCC)        Surgical History:   No past surgical history on file.    Family History:  Family History   Problem Relation Age of Onset   • COPD Mother    • Lung cancer Father        Social history:  Social History     Social History   • Marital status:      Spouse name: N/A   • Number of children: N/A   • Years of education: N/A     Occupational History   • Not on file.     Social History Main Topics   • Smoking status: Never Smoker   • Smokeless tobacco: Never Used   • Alcohol use Yes   • Drug use: No   • Sexual activity: Not on file     Other Topics Concern   • Not on file     Social History Narrative   • No narrative on file       Medication(active prior to today):  Current Outpatient Prescriptions  "  Medication Sig Dispense Refill   • alirocumab 75 mg/mL pen injector Inject 75 mg under the skin every 14 (fourteen) days. 2 pen 3   • cholecalciferol, vitamin D3, 5,000 unit tablet Take 5,000 Units by mouth daily.     • FREESTYLE LITE STRIPS strip 1 strip 4 (four) times a day. Dx code E 11.9 200 strip 11   • glucose 4 gram chewable tablet Take 16 g by mouth as needed for low blood sugar.     • lisinopril (PRINIVIL) 2.5 mg tablet Take 1 tablet (2.5 mg total) by mouth daily. 30 tablet 3   • metFORMIN (GLUCOPHAGE) 500 mg tablet Take 1 tablet (500 mg total) by mouth 2 (two) times a day with meals. 60 tablet 3   • multivitamin tablet Take 1 tablet by mouth daily.     • RED YEAST RICE ORAL Take by mouth.     • riboflavin, vitamin B2, 100 mg tablet Take by mouth daily.       No current facility-administered medications for this visit.        Allergies:  Erythromycin    Review of Systems:   Constitutional: Negative for fatigue and unexpected weight change. + weight loss   HENT: Negative for postnasal drip, trouble swallowing and voice change.    Eyes: Negative for visual disturbance.   Respiratory: Negative for apnea.    Cardiovascular: Negative for leg swelling.   Gastrointestinal: Negative for constipation, diarrhea and nausea.   Endocrine: Negative for polydipsia, polyphagia and polyuria.   Musculoskeletal: + for arthralgias, back pain, neck pain and neck stiffness.   Neurological: Negative for numbness and headaches.   Psychiatric/Behavioral: Negative for sleep disturbance. + stress + anxiety     Vitals Signs:  Vitals:    07/25/19 1325   BP: 112/68   BP Location: Left upper arm   Patient Position: Sitting   Pulse: 85   Weight: 66.2 kg (146 lb)   Height: 1.422 m (4' 8\")     Body mass index is 32.73 kg/m².      Physical Exam:  Constitutional: Patient is oriented to person, place, and time.  Appears well-developed and well-nourished. + overweight   Eyes: EOM are normal. Pupils are equal, round, and reactive to light. "   Neck: Normal range of motion. Neck supple.   Cardiovascular: Normal rate, regular rhythm and normal heart sounds.    Pulmonary/Chest: Effort normal and breath sounds normal.   Abdominal: Soft.   Musculoskeletal: Normal range of motion.   Neurological:  Alert and oriented to person, place, and time.   Skin: Skin is warm and dry.   Psychiatric:  Normal mood and affect. Behavior is normal. Judgment and thought content normal.   Foot Exam: Normal exam, no swelling, tenderness, instability; ligaments intact, full range of motion of all ankle/foot joints    Assessment/Plan:  Diabetes mellitus, stable (CMS/HCC) ([facility])  1. A1c 6.7  2. Continue with Metformin 500 mg 2 tabs daily   3. Continue with diet and exercise  4. Call me for any low blood sugars   5. Call me for any steroids     Obesity due to excess calories without serious comorbidity  Continue with diet and exercise     Other hyperlipidemia  Continue with Praluent though only take monthly       Labs:  Lab Results   Component Value Date    WBC 6.15 01/30/2019    HGB 12.7 01/30/2019    HCT 37.7 01/30/2019     01/30/2019    CHOL 176 07/17/2019    TRIG 137 07/17/2019    HDL 58 07/17/2019    ALT 9 07/17/2019    AST 13 07/17/2019     07/17/2019    K 5.6 (H) 07/17/2019     07/17/2019    CREATININE 0.93 07/17/2019    BUN 14 07/17/2019    CO2 27 07/17/2019    TSH 3.050 07/17/2019    HGBA1C 6.7 (H) 07/17/2019    MICROALBUR 31.5 12/03/2018       Total encounter time 25 minutes greater than 50% spent counseling/coordination of care.  I discussed the goals of diet and exercise with the patient. I discussed the goals of A1c, blood pressure, and cholesterol. I discussed the goals of blood sugar pre- and post meals with the patient.I encouraged the patient to rotate the injection sites.  I reviewed the patient's labs/imaging/medications/allergies/problem list.       Electronically signed by: JUSTO Yarbrough on 7/25/2019 2:18 PM

## 2019-07-25 ENCOUNTER — OFFICE VISIT (OUTPATIENT)
Dept: ENDOCRINOLOGY | Facility: CLINIC | Age: 64
End: 2019-07-25
Payer: COMMERCIAL

## 2019-07-25 VITALS
SYSTOLIC BLOOD PRESSURE: 112 MMHG | DIASTOLIC BLOOD PRESSURE: 68 MMHG | HEIGHT: 56 IN | HEART RATE: 85 BPM | WEIGHT: 146 LBS | BODY MASS INDEX: 32.84 KG/M2

## 2019-07-25 DIAGNOSIS — E78.49 OTHER HYPERLIPIDEMIA: ICD-10-CM

## 2019-07-25 DIAGNOSIS — E66.09 CLASS 1 OBESITY DUE TO EXCESS CALORIES WITHOUT SERIOUS COMORBIDITY IN ADULT, UNSPECIFIED BMI: ICD-10-CM

## 2019-07-25 DIAGNOSIS — E66.811 CLASS 1 OBESITY DUE TO EXCESS CALORIES WITHOUT SERIOUS COMORBIDITY IN ADULT, UNSPECIFIED BMI: ICD-10-CM

## 2019-07-25 DIAGNOSIS — E11.9 DIABETES MELLITUS, STABLE (CMS/HCC): Primary | ICD-10-CM

## 2019-07-25 PROCEDURE — 99214 OFFICE O/P EST MOD 30 MIN: CPT | Performed by: NURSE PRACTITIONER

## 2019-07-25 RX ORDER — LISINOPRIL 2.5 MG/1
2.5 TABLET ORAL DAILY
Qty: 30 TABLET | Refills: 3 | Status: SHIPPED | OUTPATIENT
Start: 2019-07-25 | End: 2019-12-31 | Stop reason: SDUPTHER

## 2019-07-25 RX ORDER — METFORMIN HYDROCHLORIDE 500 MG/1
500 TABLET ORAL 2 TIMES DAILY WITH MEALS
Qty: 60 TABLET | Refills: 3 | Status: SHIPPED | OUTPATIENT
Start: 2019-07-25 | End: 2019-10-30 | Stop reason: SDUPTHER

## 2019-07-25 NOTE — PATIENT INSTRUCTIONS
Diabetes mellitus, stable (CMS/[facility]) ([facility])  1. A1c 6.7  2. Continue with Metformin 500 mg 2 tabs daily   3. Continue with diet and exercise  4. Call me for any low blood sugars   5. Call me for any steroids     Obesity due to excess calories without serious comorbidity  Continue with diet and exercise     Other hyperlipidemia  Continue with Praluent though only take monthly     Patient Education     Diabetes Mellitus and Exercise  Exercising regularly is important for your overall health, especially when you have diabetes (diabetes mellitus). Exercising is not only about losing weight. It has many health benefits, such as increasing muscle strength and bone density and reducing body fat and stress. This leads to improved fitness, flexibility, and endurance, all of which result in better overall health.  Exercise has additional benefits for people with diabetes, including:  · Reducing appetite.  · Helping to lower and control blood glucose.  · Lowering blood pressure.  · Helping to control amounts of fatty substances (lipids) in the blood, such as cholesterol and triglycerides.  · Helping the body to respond better to insulin (improving insulin sensitivity).  · Reducing how much insulin the body needs.  · Decreasing the risk for heart disease by:  ¨ Lowering cholesterol and triglyceride levels.  ¨ Increasing the levels of good cholesterol.  ¨ Lowering blood glucose levels.  What is my activity plan?  Your health care provider or certified diabetes educator can help you make a plan for the type and frequency of exercise (activity plan) that works for you. Make sure that you:  · Do at least 150 minutes of moderate-intensity or vigorous-intensity exercise each week. This could be brisk walking, biking, or water aerobics.  ¨ Do stretching and strength exercises, such as yoga or weightlifting, at least 2 times a week.  ¨ Spread out your activity over at least 3 days of the week.  · Get some form of physical activity every  day.  ¨ Do not go more than 2 days in a row without some kind of physical activity.  ¨ Avoid being inactive for more than 90 minutes at a time. Take frequent breaks to walk or stretch.  · Choose a type of exercise or activity that you enjoy, and set realistic goals.  · Start slowly, and gradually increase the intensity of your exercise over time.  What do I need to know about managing my diabetes?  · Check your blood glucose before and after exercising.  ¨ If your blood glucose is higher than 240 mg/dL (13.3 mmol/L) before you exercise, check your urine for ketones. If you have ketones in your urine, do not exercise until your blood glucose returns to normal.  · Know the symptoms of low blood glucose (hypoglycemia) and how to treat it. Your risk for hypoglycemia increases during and after exercise. Common symptoms of hypoglycemia can include:  ¨ Hunger.  ¨ Anxiety.  ¨ Sweating and feeling clammy.  ¨ Confusion.  ¨ Dizziness or feeling light-headed.  ¨ Increased heart rate or palpitations.  ¨ Blurry vision.  ¨ Tingling or numbness around the mouth, lips, or tongue.  ¨ Tremors or shakes.  ¨ Irritability.  · Keep a rapid-acting carbohydrate snack available before, during, and after exercise to help prevent or treat hypoglycemia.  · Avoid injecting insulin into areas of the body that are going to be exercised. For example, avoid injecting insulin into:  ¨ The arms, when playing tennis.  ¨ The legs, when jogging.  · Keep records of your exercise habits. Doing this can help you and your health care provider adjust your diabetes management plan as needed. Write down:  ¨ Food that you eat before and after you exercise.  ¨ Blood glucose levels before and after you exercise.  ¨ The type and amount of exercise you have done.  ¨ When your insulin is expected to peak, if you use insulin. Avoid exercising at times when your insulin is peaking.  · When you start a new exercise or activity, work with your health care provider to  make sure the activity is safe for you, and to adjust your insulin, medicines, or food intake as needed.  · Drink plenty of water while you exercise to prevent dehydration or heat stroke. Drink enough fluid to keep your urine clear or pale yellow.  This information is not intended to replace advice given to you by your health care provider. Make sure you discuss any questions you have with your health care provider.  Document Released: 03/09/2005 Document Revised: 07/07/2017 Document Reviewed: 05/29/2017  [employer] Interactive Patient Education © 2018 [employer]

## 2019-08-06 ENCOUNTER — TRANSCRIBE ORDERS (OUTPATIENT)
Dept: RADIOLOGY | Age: 64
End: 2019-08-06

## 2019-08-06 ENCOUNTER — HOSPITAL ENCOUNTER (OUTPATIENT)
Dept: RADIOLOGY | Age: 64
Discharge: HOME | End: 2019-08-06
Attending: PHYSICIAN ASSISTANT
Payer: COMMERCIAL

## 2019-08-06 ENCOUNTER — APPOINTMENT (OUTPATIENT)
Dept: LAB | Age: 64
End: 2019-08-06
Attending: PHYSICIAN ASSISTANT
Payer: COMMERCIAL

## 2019-08-06 DIAGNOSIS — M54.2 CERVICALGIA: ICD-10-CM

## 2019-08-06 DIAGNOSIS — R06.02 SHORTNESS OF BREATH: ICD-10-CM

## 2019-08-06 DIAGNOSIS — R06.02 SHORTNESS OF BREATH: Primary | ICD-10-CM

## 2019-08-06 LAB
BASOPHILS # BLD: 0.05 K/UL (ref 0.01–0.1)
BASOPHILS NFR BLD: 0.7 %
CRP SERPL-MCNC: 9.77 MG/L
DIFFERENTIAL METHOD BLD: NORMAL
EOSINOPHIL # BLD: 0.1 K/UL (ref 0.04–0.36)
EOSINOPHIL NFR BLD: 1.4 %
ERYTHROCYTE [DISTWIDTH] IN BLOOD BY AUTOMATED COUNT: 13 % (ref 11.7–14.4)
ERYTHROCYTE [SEDIMENTATION RATE] IN BLOOD BY WESTERGREN METHOD: 36 MM/HR
HCT VFR BLDCO AUTO: 38.7 %
HGB BLD-MCNC: 12.5 G/DL
IMM GRANULOCYTES # BLD AUTO: 0.03 K/UL (ref 0–0.08)
IMM GRANULOCYTES NFR BLD AUTO: 0.4 %
LYMPHOCYTES # BLD: 2 K/UL (ref 1.2–3.5)
LYMPHOCYTES NFR BLD: 27.1 %
MCH RBC QN AUTO: 28.7 PG (ref 28–33.2)
MCHC RBC AUTO-ENTMCNC: 32.3 G/DL (ref 32.2–35.5)
MCV RBC AUTO: 88.8 FL (ref 83–98)
MONOCYTES # BLD: 0.54 K/UL (ref 0.28–0.8)
MONOCYTES NFR BLD: 7.3 %
NEUTROPHILS # BLD: 4.65 K/UL (ref 1.7–7)
NEUTS SEG NFR BLD: 63.1 %
NRBC BLD-RTO: 0 %
PDW BLD AUTO: 10.5 FL (ref 9.4–12.3)
PLATELET # BLD AUTO: 238 K/UL
RBC # BLD AUTO: 4.36 M/UL (ref 3.93–5.22)
TSH SERPL DL<=0.05 MIU/L-ACNC: 2.36 MIU/L (ref 0.34–5.6)
WBC # BLD AUTO: 7.37 K/UL

## 2019-08-06 PROCEDURE — 83880 ASSAY OF NATRIURETIC PEPTIDE: CPT

## 2019-08-06 PROCEDURE — 86617 LYME DISEASE ANTIBODY: CPT

## 2019-08-06 PROCEDURE — 36415 COLL VENOUS BLD VENIPUNCTURE: CPT

## 2019-08-06 PROCEDURE — 85025 COMPLETE CBC W/AUTO DIFF WBC: CPT

## 2019-08-06 PROCEDURE — 71046 X-RAY EXAM CHEST 2 VIEWS: CPT

## 2019-08-06 PROCEDURE — 84443 ASSAY THYROID STIM HORMONE: CPT

## 2019-08-06 PROCEDURE — 85652 RBC SED RATE AUTOMATED: CPT

## 2019-08-06 PROCEDURE — 85379 FIBRIN DEGRADATION QUANT: CPT

## 2019-08-06 PROCEDURE — 86140 C-REACTIVE PROTEIN: CPT

## 2019-08-07 LAB
B BURGDOR IGG SER QL IB: NEGATIVE
B BURGDOR IGM SER QL IB: NEGATIVE
B BURGDOR18KD IGG SER QL IB: ABNORMAL
B BURGDOR23KD IGG SER QL IB: ABNORMAL
B BURGDOR23KD IGM SER QL IB: ABNORMAL
B BURGDOR28KD IGG SER QL IB: ABNORMAL
B BURGDOR30KD IGG SER QL IB: ABNORMAL
B BURGDOR39KD IGG SER QL IB: ABNORMAL
B BURGDOR39KD IGM SER QL IB: ABNORMAL
B BURGDOR41KD IGG SER QL IB: REACTIVE
B BURGDOR41KD IGM SER QL IB: ABNORMAL
B BURGDOR45KD IGG SER QL IB: ABNORMAL
B BURGDOR58KD IGG SER QL IB: ABNORMAL
B BURGDOR66KD IGG SER QL IB: ABNORMAL
B BURGDOR93KD IGG SER QL IB: ABNORMAL
BNP SERPL-MCNC: 25 PG/ML
D DIMER PPP IA.FEU-MCNC: 0.51 UG/ML FEU (ref 0–0.5)

## 2019-08-08 ENCOUNTER — TRANSCRIBE ORDERS (OUTPATIENT)
Dept: SCHEDULING | Age: 64
End: 2019-08-08

## 2019-08-08 DIAGNOSIS — J45.20 MILD INTERMITTENT ASTHMA, UNCOMPLICATED: ICD-10-CM

## 2019-08-08 DIAGNOSIS — R06.02 SHORTNESS OF BREATH: Primary | ICD-10-CM

## 2019-08-09 ENCOUNTER — HOSPITAL ENCOUNTER (OUTPATIENT)
Dept: RADIOLOGY | Facility: HOSPITAL | Age: 64
Discharge: HOME | End: 2019-08-09
Attending: PHYSICIAN ASSISTANT
Payer: COMMERCIAL

## 2019-08-09 ENCOUNTER — TRANSCRIBE ORDERS (OUTPATIENT)
Dept: RADIOLOGY | Facility: HOSPITAL | Age: 64
End: 2019-08-09

## 2019-08-09 DIAGNOSIS — R06.02 SHORTNESS OF BREATH: ICD-10-CM

## 2019-08-09 DIAGNOSIS — J45.20 MILD INTERMITTENT ASTHMA, UNCOMPLICATED: ICD-10-CM

## 2019-08-09 PROCEDURE — 63600105 HC IODINE BASED CONTRAST

## 2019-08-09 PROCEDURE — 71260 CT THORAX DX C+: CPT

## 2019-08-09 RX ADMIN — IOHEXOL 100 ML: 350 INJECTION, SOLUTION INTRAVENOUS at 15:00

## 2019-08-12 ENCOUNTER — TELEPHONE (OUTPATIENT)
Dept: ENDOCRINOLOGY | Facility: CLINIC | Age: 64
End: 2019-08-12

## 2019-08-12 RX ORDER — LANCETS 28 GAUGE
EACH MISCELLANEOUS
Qty: 400 EACH | Refills: 3 | Status: SHIPPED | OUTPATIENT
Start: 2019-08-12 | End: 2020-08-11 | Stop reason: SDUPTHER

## 2019-08-16 ENCOUNTER — TRANSCRIBE ORDERS (OUTPATIENT)
Dept: LAB | Age: 64
End: 2019-08-16

## 2019-08-16 ENCOUNTER — APPOINTMENT (OUTPATIENT)
Dept: LAB | Age: 64
End: 2019-08-16
Attending: PHYSICIAN ASSISTANT
Payer: COMMERCIAL

## 2019-08-16 DIAGNOSIS — M79.18 MYALGIA, OTHER SITE: Primary | ICD-10-CM

## 2019-08-16 DIAGNOSIS — M79.18 MYALGIA, OTHER SITE: ICD-10-CM

## 2019-08-16 LAB
CK SERPL-CCNC: 78 U/L (ref 15–200)
URATE SERPL-MCNC: 4.5 MG/DL (ref 2.6–8)

## 2019-08-16 PROCEDURE — 82550 ASSAY OF CK (CPK): CPT

## 2019-08-16 PROCEDURE — 36415 COLL VENOUS BLD VENIPUNCTURE: CPT

## 2019-08-16 PROCEDURE — 86431 RHEUMATOID FACTOR QUANT: CPT

## 2019-08-16 PROCEDURE — 30000001 MISCELLANEOUS LAB TEST

## 2019-08-16 PROCEDURE — 84550 ASSAY OF BLOOD/URIC ACID: CPT

## 2019-08-16 PROCEDURE — 86038 ANTINUCLEAR ANTIBODIES: CPT

## 2019-08-16 PROCEDURE — 82553 CREATINE MB FRACTION: CPT

## 2019-08-17 LAB
ALBUMIN SERPL-MCNC: 4.3 G/DL (ref 3.6–4.8)
ALBUMIN/GLOB SERPL: 1.7 {RATIO} (ref 1.2–2.2)
ALP SERPL-CCNC: 73 IU/L (ref 39–117)
ALT SERPL-CCNC: 10 IU/L (ref 0–32)
AST SERPL-CCNC: 14 IU/L (ref 0–40)
BILIRUB SERPL-MCNC: 0.3 MG/DL (ref 0–1.2)
BUN SERPL-MCNC: 13 MG/DL (ref 8–27)
BUN/CREAT SERPL: 15 (ref 12–28)
CALCIUM SERPL-MCNC: 9.3 MG/DL (ref 8.7–10.3)
CHLORIDE SERPL-SCNC: 101 MMOL/L (ref 96–106)
CO2 SERPL-SCNC: 25 MMOL/L (ref 20–29)
CREAT SERPL-MCNC: 0.86 MG/DL (ref 0.57–1)
GLOBULIN SER CALC-MCNC: 2.5 G/DL (ref 1.5–4.5)
GLUCOSE SERPL-MCNC: 104 MG/DL (ref 65–99)
LAB CORP EGFR IF AFRICN AM: 83 ML/MIN/1.73
LAB CORP EGFR IF NONAFRICN AM: 72 ML/MIN/1.73
POTASSIUM SERPL-SCNC: 4.4 MMOL/L (ref 3.5–5.2)
PROT SERPL-MCNC: 6.8 G/DL (ref 6–8.5)
SODIUM SERPL-SCNC: 141 MMOL/L (ref 134–144)

## 2019-08-18 LAB — RHEUMATOID FACT SERPL-ACNC: <20 IU/ML

## 2019-08-19 LAB
ANA SER QL IA: NEGATIVE
LABORATORY REPORT: NORMAL

## 2019-08-30 ENCOUNTER — TRANSCRIBE ORDERS (OUTPATIENT)
Dept: SCHEDULING | Age: 64
End: 2019-08-30

## 2019-08-30 DIAGNOSIS — J98.6 DISORDER OF DIAPHRAGM: Primary | ICD-10-CM

## 2019-09-03 ENCOUNTER — HOSPITAL ENCOUNTER (OUTPATIENT)
Dept: RADIOLOGY | Facility: HOSPITAL | Age: 64
Discharge: HOME | End: 2019-09-03
Attending: INTERNAL MEDICINE
Payer: COMMERCIAL

## 2019-09-03 DIAGNOSIS — J98.6 DISORDER OF DIAPHRAGM: ICD-10-CM

## 2019-09-03 PROCEDURE — 76000 FLUOROSCOPY <1 HR PHYS/QHP: CPT

## 2019-09-06 ENCOUNTER — TRANSCRIBE ORDERS (OUTPATIENT)
Dept: SCHEDULING | Age: 64
End: 2019-09-06

## 2019-09-06 DIAGNOSIS — J98.6 DISORDER OF DIAPHRAGM: Primary | ICD-10-CM

## 2019-09-10 ENCOUNTER — HOSPITAL ENCOUNTER (OUTPATIENT)
Dept: RADIOLOGY | Facility: HOSPITAL | Age: 64
Discharge: HOME | End: 2019-09-10
Attending: INTERNAL MEDICINE
Payer: COMMERCIAL

## 2019-09-10 VITALS — WEIGHT: 145 LBS | BODY MASS INDEX: 32.51 KG/M2

## 2019-09-10 DIAGNOSIS — J98.6 DISORDER OF DIAPHRAGM: ICD-10-CM

## 2019-09-12 ENCOUNTER — TELEPHONE (OUTPATIENT)
Dept: ENDOCRINOLOGY | Facility: CLINIC | Age: 64
End: 2019-09-12

## 2019-09-12 DIAGNOSIS — E11.8 TYPE 2 DIABETES MELLITUS WITH COMPLICATION, WITHOUT LONG-TERM CURRENT USE OF INSULIN (CMS/HCC): Primary | ICD-10-CM

## 2019-09-13 ENCOUNTER — TELEPHONE (OUTPATIENT)
Dept: NEUROSURGERY | Facility: CLINIC | Age: 64
End: 2019-09-13

## 2019-09-13 NOTE — TELEPHONE ENCOUNTER
Consult    Referring Doctor:           Dr Gonzalez (pulmonology)    PCP:       Loretta fuller      Recent Imaging:  · MRI C spine 9/10/2019, done at [place], in [facility], pt has disc  · Dexa 10/11/2017 is most recent, in Epic  · No Xrays, EMG      Reason for visit:      Neck pain. Numbness and tingling in both arms and hands, some tingling middle of back. Headaches. Dizziness. Sciatica down left leg to foot. Legs can give out.     Onset of symptoms:      Years, worse since 8 mos      Physical Therapy:      no    Pain Management:  no    Previous Surgery:      no    Additional Comments:    · Breathing problems-Right lung paralyzed       Insurance:  Personal Choice  In Epic

## 2019-09-17 DIAGNOSIS — M81.0 OSTEOPOROSIS, SENILE: ICD-10-CM

## 2019-09-17 DIAGNOSIS — M47.12 CERVICAL SPONDYLOSIS WITH MYELOPATHY: Primary | ICD-10-CM

## 2019-09-17 NOTE — TELEPHONE ENCOUNTER
I've entered a response to the patient's message below.    Response to the message:       Cervical Xrays (flexion-extension).  Scoliosis Xrays (kyphosis C spine)  DEXA    Dr Gonzalez asked me to get her in on sooner side.

## 2019-09-17 NOTE — TELEPHONE ENCOUNTER
Spoke with patient and offered her a sooner appt. 9/26.  She could not take b/c  having surgery.  She also has to wait till 10/14 to get her dexa scan.  Scheduled her for 10/17.

## 2019-09-23 ENCOUNTER — TELEPHONE (OUTPATIENT)
Dept: ENDOCRINOLOGY | Facility: CLINIC | Age: 64
End: 2019-09-23

## 2019-10-14 ENCOUNTER — HOSPITAL ENCOUNTER (OUTPATIENT)
Dept: RADIOLOGY | Facility: HOSPITAL | Age: 64
Discharge: HOME | End: 2019-10-14
Attending: NEUROLOGICAL SURGERY
Payer: COMMERCIAL

## 2019-10-14 DIAGNOSIS — M47.12 CERVICAL SPONDYLOSIS WITH MYELOPATHY: ICD-10-CM

## 2019-10-14 DIAGNOSIS — M81.0 OSTEOPOROSIS, SENILE: ICD-10-CM

## 2019-10-14 PROCEDURE — 77080 DXA BONE DENSITY AXIAL: CPT

## 2019-10-14 PROCEDURE — 72082 X-RAY EXAM ENTIRE SPI 2/3 VW: CPT

## 2019-10-14 PROCEDURE — 72040 X-RAY EXAM NECK SPINE 2-3 VW: CPT

## 2019-10-17 ENCOUNTER — CONSULT (OUTPATIENT)
Dept: NEUROSURGERY | Facility: CLINIC | Age: 64
End: 2019-10-17
Payer: COMMERCIAL

## 2019-10-17 VITALS
HEART RATE: 76 BPM | SYSTOLIC BLOOD PRESSURE: 141 MMHG | OXYGEN SATURATION: 94 % | HEIGHT: 56 IN | DIASTOLIC BLOOD PRESSURE: 80 MMHG | BODY MASS INDEX: 32.62 KG/M2 | WEIGHT: 145 LBS

## 2019-10-17 DIAGNOSIS — M47.12 CERVICAL SPONDYLOSIS WITH MYELOPATHY: Primary | ICD-10-CM

## 2019-10-17 PROCEDURE — 99205 OFFICE O/P NEW HI 60 MIN: CPT | Performed by: NEUROLOGICAL SURGERY

## 2019-10-17 RX ORDER — MELOXICAM 7.5 MG/1
7.5 TABLET ORAL
Refills: 1 | COMMUNITY
Start: 2019-09-23 | End: 2020-08-11 | Stop reason: ALTCHOICE

## 2019-10-17 NOTE — LETTER
October 17, 2019                                                                                                                                                                                                                                                                                                               Cuco Gonzalez, DO  [address] [facility]  [place] [place]  [place] [address]    Patient: Edd Hammond  YOB: 1955  Date of Visit: 10/17/2019    Dear Dr. Gonzalez:    Thank you for referring Edd Hammond to me for evaluation. Below are the relevant portions of my assessment and plan of care.    If you have questions, please do not hesitate to call me. I look forward to following Edd along with you.         Sincerely,        Delvin Haddad MD        CC: ALVARO Harrington        Dear Cuco,    I had the pleasure of meeting a patient of yours in the office today.  Thank you for your kind referral.  As you may recall and as we recently discussed, Ms. Hammond is a very pleasant 64-year-old woman who has an 8-month history of shortness of breath.  She does have asthma but an extensive work-up revealed her shortness of breath is due to paralysis of the right hemidiaphragm.  She reports dyspnea on exertion and dyspnea when lying flat and has difficulty sleeping at night.  She also complains of chronic neck pain.  She also complains of symptoms consistent with early myelopathy with some heaviness in her legs, some of which she attributes to hip issues.  She will occasionally have hand numbness and tingling but denies dropping things, changes in her handwriting or difficulty controlling bowel or bladder function.  She has what sounds like cervicogenic headaches.  She does not have radicular pain into her arms or hands.  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  She reports that over time she feels as if her head  is sticking out further in front of her body and this does result in increased discomfort in between her shoulder blades.  Cuco, I reviewed your records.  She was found to have spinal cord compression and multilevel cervical stenosis with signal change in the cord.  Given these findings and her right hemidiaphragm paralysis, she was referred for a neurosurgical opinion.    Imaging: I personally reviewed her old x-rays from 2004, her current MRI and I have ordered her postural long cassette x-rays and cervical x-rays with dynamic views.  First, her 2004 x-rays show an overall loss of lordosis with a mild kyphosis.  This has markedly progressed over time she now has a cervical thoracic kyphosis in addition to a cervical kyphosis.  She has straightening of the thoracic spine and does have DISH (diffuse idiopathic skeletal hyperostosis that results in autofusion of multiple bones of the thoracic spine).  She is attempting to autofused across the C6-7 and C7-T1 levels and then has severe degenerative disc disease at C3-4 and C5-6 more so than C4-5.  There is severe stenosis at C3-4 with signal change in the cord and moderate stenosis at C5-6 with subtle signal change there as well.  A large mismatch between her T1 slope and cervical lordosis and a very positive cervical SVA indicating her head lies quite far ahead of her shoulders.                  Review of Systems: 14 point review of systems are negative except for the following pertinent positives: Memory issues, fatigue, hearing loss, sensitivity to light, sleep apnea, cough, shortness of breath, leg swelling, mostly on the left, constipation, nausea, environmental allergies, dizziness, sadness/depression, sleep disturbance and lower back pain and joint pain and osteoarthritis, particularly in the hips..    Medical History:   Past Medical History:   Diagnosis Date   • Arthritis    • Asthma    • Diverticulitis of colon    • High cholesterol    • Hypertension    • Type  2 diabetes mellitus (CMS/HCC)        Surgical History:   Past Surgical History:   Procedure Laterality Date   • CARPAL TUNNEL RELEASE     • ROTATOR CUFF REPAIR         Social History:   Social History     Socioeconomic History   • Marital status:      Spouse name: None   • Number of children: None   • Years of education: None   • Highest education level: None   Occupational History   • None   Social Needs   • Financial resource strain: None   • Food insecurity:     Worry: None     Inability: None   • Transportation needs:     Medical: None     Non-medical: None   Tobacco Use   • Smoking status: Never Smoker   • Smokeless tobacco: Never Used   Substance and Sexual Activity   • Alcohol use: Yes   • Drug use: No   • Sexual activity: None   Lifestyle   • Physical activity:     Days per week: None     Minutes per session: None   • Stress: None   Relationships   • Social connections:     Talks on phone: None     Gets together: None     Attends Jewish service: None     Active member of club or organization: None     Attends meetings of clubs or organizations: None     Relationship status: None   • Intimate partner violence:     Fear of current or ex partner: None     Emotionally abused: None     Physically abused: None     Forced sexual activity: None   Other Topics Concern   • None   Social History Narrative   • None       Family History:   Family History   Problem Relation Age of Onset   • COPD Biological Mother    • Lung cancer Biological Father        Allergies: Erythromycin    Home Medications:  Not in a hospital admission.    Current Medications:  •  alirocumab  •  budesonide-formoterol  •  cholecalciferol (vitamin D3)  •  fexofenadine  •  freestyle  •  FREESTYLE LITE STRIPS  •  glucose  •  lisinopril  •  meloxicam  •  metFORMIN  •  riboflavin (vitamin B2)  •  albuterol HFA  •  multivitamin  •  RED YEAST RICE ORAL      Physicial Exam    Vital Signs   Vitals:    10/17/19 1302   BP: (!) 141/80   Pulse: 76    SpO2: 94%       Awake, alert, oriented to person, place, time and situation; speech and fund of knowledge normal.  Neck is supple w/ FROM, is nontender and no JVD. Chest CTA without rales. Heart has a regular rate and rhythm without murmur. Abdomen soft, NT, ND, + BS.     Neurological examination:    PERRL, extra-ocular movements intact, face symmetric, tongue protrudes to midline, no nystagmus or diplopia.  Motor:     RUE:  D  5/5, B  5/5, T 5/5, WE 5/5, HG 5/5, IH 4+/5   LUE:  D  5/5, B  5/5, T 5/5, WE 5/5, HG 5/5, IH 4+/5   RLE:  IP  5/5, Q  5/5, DF 5/5, EHL 5/5, PF 5/5   LLE:  IP  5/5, Q  5/5, DF 5/5, EHL 5/5, PF 5/5  Reflexes:  Normoreflexive, no Mera's or Babinski signs, no clonus  Sensory exam:  Intact to light touch, pain, temperature and JPS testing  Gait and posture normal.  But she does have some difficulty with tandem gait  No dysmetria.      Assessment/Plan     This is a 64-year-old woman with DISH (diffuse idiopathic skeletal hyperostosis that results in autofusion of multiple bones of the thoracic spine) was developing a progressively worsening cervical thoracic kyphosis.  She has early cervical myelopathy on clinical examination and history but she has severe spinal cord compression and signal change particularly at C3-4.  She also has spondylosis at C4-5 and C5-6.  She has severe compression of the right more so than left sided foramina at C3-4 and C4-5 with compression of the exiting C4 and C5 nerve roots.  Thus, she has both spinal cord compression/myelopathy and radicular compromise at the upper cervical levels, particularly C4 and C5 we do see this from time to time as a cause of hemidiaphragm paralysis.  Diaphragm is innervated by the C3, C4 and C5 nerve roots.  In most cases we are unable to determine if it is due to the cord or root compression.    Her biggest complaint is the shortness of breath.  I had a long discussion with her that we do not have good data to suggest that cervical  decompression can cause return of phrenic nerve and diaphragmatic function.  However, we have anecdotal evidence and personal cases where there has been improvement in recovery of function.  However, the surgeries are typically performed for concomitant cervical cord compression and myelopathy or radicular compromise resulting in pain or extremity or shoulder weakness, not specifically to attempt to recover diaphragm function.  She understands this distinction.    I have discussed the natural history of cervical spondylotic myelopathy.  With cord compression on imaging (especially if signal change present) and a patient starts to exhibit signs of myelopathy on exam (arm/hand weakness and numbness, brisk reflexes, gait difficulty, etc), the natural history of the condition does tend to be one of progressive neurological deterioration.  In approximately, 20% of patients, it can be a slow deterioration.  But, in the majority of patients, it tends to unfold as a progressive, stepwise deterioration in neurological function with episodes of worsening function followed by partial recovery and the process continues.  Thus, in the setting of radiographic findings of cord compression and signs and symptoms of myelopathy, typically do recommend treatment with decompression and stabilization.  I stressed that the main goal of surgery to halt progression of neurological loss (and not necessarily to improve) but most patients do, in fact, improve and regain function.  The typical recovery is increased strength in the proximal muscles of arms and legs, improvement in gait and the last functions to recover are fine motor movements and sensation in hands.  Rate and extent of recovery depends on patient age, severity of deficits, duration of deficits and age and requires aggressive PT/OT.  Then, she has early myelopathy and is not prepared for any surgical intervention at this time.  This is understandable.    Indications to proceed  with surgery on a more urgent basis would include development of myelopathy, motor function loss, etc.  I educated her on signs and symptoms and she will call the office or return sooner should any new issues arise.  I did use anatomical models to explain her condition.  She would likely need multilevel decompression and fusion.  However, she also has a significant cervical kyphosis and a cervical thoracic kyphosis.  Any surgical intervention would likely have to attempt to maximally restore her lordosis using hyperlordotic cages and likely using osteotomies from the C3-C6 levels.  However, this will only partially correct her deformity which I believe is worsening at the cervicothoracic junction.  However, she appears to be autofused across many of the segments of the upper thoracic spine and may have a fixed deformity.  And, we will hold off on surgical intervention at this time.    All questions were answered.  Thank you very much for the opportunity to be involved in the care of your patient.  Please call the office should any questions or problems arise.    Sincerely,       Delvin Haddad MD

## 2019-10-17 NOTE — PROGRESS NOTES
Dear Cuco,    I had the pleasure of meeting a patient of yours in the office today.  Thank you for your kind referral.  As you may recall and as we recently discussed, Ms. Hammond is a very pleasant 64-year-old woman who has an 8-month history of shortness of breath.  She does have asthma but an extensive work-up revealed her shortness of breath is due to paralysis of the right hemidiaphragm.  She reports dyspnea on exertion and dyspnea when lying flat and has difficulty sleeping at night.  She also complains of chronic neck pain.  She also complains of symptoms consistent with early myelopathy with some heaviness in her legs, some of which she attributes to hip issues.  She will occasionally have hand numbness and tingling but denies dropping things, changes in her handwriting or difficulty controlling bowel or bladder function.  She has what sounds like cervicogenic headaches.  She does not have radicular pain into her arms or hands.  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  She reports that over time she feels as if her head is sticking out further in front of her body and this does result in increased discomfort in between her shoulder blades.  Cuco, I reviewed your records.  She was found to have spinal cord compression and multilevel cervical stenosis with signal change in the cord.  Given these findings and her right hemidiaphragm paralysis, she was referred for a neurosurgical opinion.    Imaging: I personally reviewed her old x-rays from 2004, her current MRI and I have ordered her postural long cassette x-rays and cervical x-rays with dynamic views.  First, her 2004 x-rays show an overall loss of lordosis with a mild kyphosis.  This has markedly progressed over time she now has a cervical thoracic kyphosis in addition to a cervical kyphosis.  She has straightening of the thoracic spine and does have DISH (diffuse idiopathic  skeletal hyperostosis that results in autofusion of multiple bones of the thoracic spine).  She is attempting to autofused across the C6-7 and C7-T1 levels and then has severe degenerative disc disease at C3-4 and C5-6 more so than C4-5.  There is severe stenosis at C3-4 with signal change in the cord and moderate stenosis at C5-6 with subtle signal change there as well.  A large mismatch between her T1 slope and cervical lordosis and a very positive cervical SVA indicating her head lies quite far ahead of her shoulders.                  Review of Systems: 14 point review of systems are negative except for the following pertinent positives: Memory issues, fatigue, hearing loss, sensitivity to light, sleep apnea, cough, shortness of breath, leg swelling, mostly on the left, constipation, nausea, environmental allergies, dizziness, sadness/depression, sleep disturbance and lower back pain and joint pain and osteoarthritis, particularly in the hips..    Medical History:   Past Medical History:   Diagnosis Date   • Arthritis    • Asthma    • Diverticulitis of colon    • High cholesterol    • Hypertension    • Type 2 diabetes mellitus (CMS/HCC)        Surgical History:   Past Surgical History:   Procedure Laterality Date   • CARPAL TUNNEL RELEASE     • ROTATOR CUFF REPAIR         Social History:   Social History     Socioeconomic History   • Marital status:      Spouse name: None   • Number of children: None   • Years of education: None   • Highest education level: None   Occupational History   • None   Social Needs   • Financial resource strain: None   • Food insecurity:     Worry: None     Inability: None   • Transportation needs:     Medical: None     Non-medical: None   Tobacco Use   • Smoking status: Never Smoker   • Smokeless tobacco: Never Used   Substance and Sexual Activity   • Alcohol use: Yes   • Drug use: No   • Sexual activity: None   Lifestyle   • Physical activity:     Days per week: None      Minutes per session: None   • Stress: None   Relationships   • Social connections:     Talks on phone: None     Gets together: None     Attends Latter day service: None     Active member of club or organization: None     Attends meetings of clubs or organizations: None     Relationship status: None   • Intimate partner violence:     Fear of current or ex partner: None     Emotionally abused: None     Physically abused: None     Forced sexual activity: None   Other Topics Concern   • None   Social History Narrative   • None       Family History:   Family History   Problem Relation Age of Onset   • COPD Biological Mother    • Lung cancer Biological Father        Allergies: Erythromycin    Home Medications:  Not in a hospital admission.    Current Medications:  •  alirocumab  •  budesonide-formoterol  •  cholecalciferol (vitamin D3)  •  fexofenadine  •  freestyle  •  FREESTYLE LITE STRIPS  •  glucose  •  lisinopril  •  meloxicam  •  metFORMIN  •  riboflavin (vitamin B2)  •  albuterol HFA  •  multivitamin  •  RED YEAST RICE ORAL      Physicial Exam    Vital Signs   Vitals:    10/17/19 1302   BP: (!) 141/80   Pulse: 76   SpO2: 94%       Awake, alert, oriented to person, place, time and situation; speech and fund of knowledge normal.  Neck is supple w/ FROM, is nontender and no JVD. Chest CTA without rales. Heart has a regular rate and rhythm without murmur. Abdomen soft, NT, ND, + BS.     Neurological examination:    PERRL, extra-ocular movements intact, face symmetric, tongue protrudes to midline, no nystagmus or diplopia.  Motor:     RUE:  D  5/5, B  5/5, T 5/5, WE 5/5, HG 5/5, IH 4+/5   LUE:  D  5/5, B  5/5, T 5/5, WE 5/5, HG 5/5, IH 4+/5   RLE:  IP  5/5, Q  5/5, DF 5/5, EHL 5/5, PF 5/5   LLE:  IP  5/5, Q  5/5, DF 5/5, EHL 5/5, PF 5/5  Reflexes:  Normoreflexive, no Mera's or Babinski signs, no clonus  Sensory exam:  Intact to light touch, pain, temperature and JPS testing  Gait and posture normal.  But she does have  some difficulty with tandem gait  No dysmetria.      Assessment/Plan     This is a 64-year-old woman with DISH (diffuse idiopathic skeletal hyperostosis that results in autofusion of multiple bones of the thoracic spine) was developing a progressively worsening cervical thoracic kyphosis.  She has early cervical myelopathy on clinical examination and history but she has severe spinal cord compression and signal change particularly at C3-4.  She also has spondylosis at C4-5 and C5-6.  She has severe compression of the right more so than left sided foramina at C3-4 and C4-5 with compression of the exiting C4 and C5 nerve roots.  Thus, she has both spinal cord compression/myelopathy and radicular compromise at the upper cervical levels, particularly C4 and C5 we do see this from time to time as a cause of hemidiaphragm paralysis.  Diaphragm is innervated by the C3, C4 and C5 nerve roots.  In most cases we are unable to determine if it is due to the cord or root compression.    Her biggest complaint is the shortness of breath.  I had a long discussion with her that we do not have good data to suggest that cervical decompression can cause return of phrenic nerve and diaphragmatic function.  However, we have anecdotal evidence and personal cases where there has been improvement in recovery of function.  However, the surgeries are typically performed for concomitant cervical cord compression and myelopathy or radicular compromise resulting in pain or extremity or shoulder weakness, not specifically to attempt to recover diaphragm function.  She understands this distinction.    I have discussed the natural history of cervical spondylotic myelopathy.  With cord compression on imaging (especially if signal change present) and a patient starts to exhibit signs of myelopathy on exam (arm/hand weakness and numbness, brisk reflexes, gait difficulty, etc), the natural history of the condition does tend to be one of progressive  neurological deterioration.  In approximately, 20% of patients, it can be a slow deterioration.  But, in the majority of patients, it tends to unfold as a progressive, stepwise deterioration in neurological function with episodes of worsening function followed by partial recovery and the process continues.  Thus, in the setting of radiographic findings of cord compression and signs and symptoms of myelopathy, typically do recommend treatment with decompression and stabilization.  I stressed that the main goal of surgery to halt progression of neurological loss (and not necessarily to improve) but most patients do, in fact, improve and regain function.  The typical recovery is increased strength in the proximal muscles of arms and legs, improvement in gait and the last functions to recover are fine motor movements and sensation in hands.  Rate and extent of recovery depends on patient age, severity of deficits, duration of deficits and age and requires aggressive PT/OT.  Then, she has early myelopathy and is not prepared for any surgical intervention at this time.  This is understandable.    Indications to proceed with surgery on a more urgent basis would include development of myelopathy, motor function loss, etc.  I educated her on signs and symptoms and she will call the office or return sooner should any new issues arise.  I did use anatomical models to explain her condition.  She would likely need multilevel decompression and fusion.  However, she also has a significant cervical kyphosis and a cervical thoracic kyphosis.  Any surgical intervention would likely have to attempt to maximally restore her lordosis using hyperlordotic cages and likely using osteotomies from the C3-C6 levels.  However, this will only partially correct her deformity which I believe is worsening at the cervicothoracic junction.  However, she appears to be autofused across many of the segments of the upper thoracic spine and may have a  fixed deformity.  And, we will hold off on surgical intervention at this time.    All questions were answered.  Thank you very much for the opportunity to be involved in the care of your patient.  Please call the office should any questions or problems arise.    Sincerely,       Delvin Haddad MD

## 2019-10-27 NOTE — ASSESSMENT & PLAN NOTE
1. A1c 6.2  2. Continue with Metformin 500 mg 2 tabs daily   3. Continue with diet and exercise  4. Call me for any low blood sugars   5. Call me for any steroids

## 2019-10-27 NOTE — PROGRESS NOTES
Endocrinology  Note       PATIENTS: Edd Hammond  YOB: 1955  DATE: October 30, 2019  VISIT TYPE: follow up visit    History of Present Illness:   HPI 63 year old white female with type 2 diabetes since 2011.    She is currently on metformin 500 mg 2 tabs daily.  Her fasting blood sugars are .  She reports no hypoglycemia.  She does have a past medical history of sleep apnea she cannot use her CPAP asthma and  hypertension.  She does not smoke she is a  she works from her home she is  with no children no pregnancies.  Her past surgical history includes bilateral carpal tunnel release left shoulder repair colonoscopy .  She has been working with pulmonary and states she has been diagnosed with a paralyzed diaphragm. She was last seen by ophthalmology in 2018 and podiatry most recently.  She has been intolerant to statins and Zejonathan has been on Praluent 75 mg per month.     Medical History:  Past Medical History:   Diagnosis Date   • Arthritis    • Asthma    • Diverticulitis of colon    • High cholesterol    • Hypertension    • Type 2 diabetes mellitus (CMS/HCC)        Surgical History:   Past Surgical History:   Procedure Laterality Date   • CARPAL TUNNEL RELEASE     • ROTATOR CUFF REPAIR         Family History:  Family History   Problem Relation Age of Onset   • COPD Biological Mother    • Lung cancer Biological Father        Social history:  Social History     Socioeconomic History   • Marital status:      Spouse name: Not on file   • Number of children: Not on file   • Years of education: Not on file   • Highest education level: Not on file   Occupational History   • Not on file   Social Needs   • Financial resource strain: Not on file   • Food insecurity:     Worry: Not on file     Inability: Not on file   • Transportation needs:     Medical: Not on file     Non-medical: Not on file   Tobacco Use   • Smoking status: Never Smoker   • Smokeless  tobacco: Never Used   Substance and Sexual Activity   • Alcohol use: Yes   • Drug use: No   • Sexual activity: Not on file   Lifestyle   • Physical activity:     Days per week: Not on file     Minutes per session: Not on file   • Stress: Not on file   Relationships   • Social connections:     Talks on phone: Not on file     Gets together: Not on file     Attends Christian service: Not on file     Active member of club or organization: Not on file     Attends meetings of clubs or organizations: Not on file     Relationship status: Not on file   • Intimate partner violence:     Fear of current or ex partner: Not on file     Emotionally abused: Not on file     Physically abused: Not on file     Forced sexual activity: Not on file   Other Topics Concern   • Not on file   Social History Narrative   • Not on file       Medication(active prior to today):  Current Outpatient Medications   Medication Sig Dispense Refill   • albuterol HFA (VENTOLIN HFA) 90 mcg/actuation inhaler Inhale 2 puffs every 6 (six) hours as needed.     • alirocumab 75 mg/mL pen injector Inject 75 mg under the skin every 30 (thirty) days. 3 pen 3   • budesonide-formoterol (SYMBICORT) 80-4.5 mcg/actuation inhaler Inhale 2 puffs 2 (two) times a day. Rinse mouth with water after use to reduce aftertaste and incidence of candidiasis. Do not swallow.     • cholecalciferol, vitamin D3, 5,000 unit tablet Take 5,000 Units by mouth daily.     • fexofenadine (ALLEGRA) 180 mg tablet Take 180 mg by mouth daily.     • FREESTYLE 28 gauge lancets Check blood sugar four times daily 400 each 3   • FREESTYLE LITE STRIPS strip 1 strip 4 (four) times a day. Dx code E 11.9 200 strip 11   • glucose 4 gram chewable tablet Take 16 g by mouth as needed for low blood sugar.     • lisinopril (PRINIVIL) 2.5 mg tablet Take 1 tablet (2.5 mg total) by mouth daily. 30 tablet 3   • meloxicam (MOBIC) 7.5 mg tablet Take 7.5 mg by mouth once daily.  1   • metFORMIN (GLUCOPHAGE) 500 mg  "tablet Take 1 tablet (500 mg total) by mouth 2 (two) times a day with meals. 60 tablet 3   • multivitamin tablet Take 1 tablet by mouth daily.     • RED YEAST RICE ORAL Take by mouth.     • riboflavin, vitamin B2, 100 mg tablet Take by mouth daily.       No current facility-administered medications for this visit.        Allergies:  Erythromycin    Review of Systems:   Constitutional: Negative for fatigue and unexpected weight change.   HENT: Negative for postnasal drip, trouble swallowing and voice change.    Eyes: Negative for visual disturbance.   Respiratory: + for apnea.    Cardiovascular: Negative for leg swelling.   Gastrointestinal: Negative for constipation, diarrhea and nausea.   Endocrine: Negative for polydipsia, polyphagia and polyuria.   Musculoskeletal: + for arthralgias, back pain, neck pain and neck stiffness.   Neurological: Negative for numbness and headaches.   Psychiatric/Behavioral: Negative for sleep disturbance.     Vitals Signs:  Vitals:    10/30/19 1331   BP: 132/80   BP Location: Right upper arm   Patient Position: Sitting   Pulse: 87   Weight: 65.8 kg (145 lb)   Height: 1.422 m (4' 8\")     Body mass index is 32.51 kg/m².      Physical Exam:  Constitutional: Patient is oriented to person, place, and time.  Appears well-developed and well-nourished.   Eyes: EOM are normal. Pupils are equal, round, and reactive to light.   Neck: Normal range of motion. Neck supple.   Cardiovascular: Normal rate, regular rhythm and normal heart sounds.    Pulmonary/Chest: Effort normal and breath sounds normal.   Abdominal: Soft.   Musculoskeletal: Normal range of motion.   Neurological:  Alert and oriented to person, place, and time.   Skin: Skin is warm and dry.   Psychiatric:  Normal mood and affect. Behavior is normal. Judgment and thought content normal.       Assessment/Plan:  Diabetes mellitus, stable (CMS/HCC) (HCC)  1. A1c 6.2  2. Continue with Metformin 500 mg 2 tabs daily   3. Continue with diet " and exercise  4. Call me for any low blood sugars   5. Call me for any steroids     Obesity due to excess calories without serious comorbidity  Continue with diet and exercise     Other hyperlipidemia  Continue with Praluent       Labs:  Lab Results   Component Value Date    WBC 7.37 08/06/2019    HGB 12.5 08/06/2019    HCT 38.7 08/06/2019     08/06/2019    CHOL 230 (H) 10/28/2019    TRIG 149 10/28/2019    HDL 53 10/28/2019    ALT 11 10/28/2019    AST 12 10/28/2019     10/28/2019    K 5.3 (H) 10/28/2019     10/28/2019    CREATININE 0.88 10/28/2019    BUN 15 10/28/2019    CO2 29 10/28/2019    TSH 2.520 10/28/2019    HGBA1C 6.2 (H) 10/28/2019    MICROALBUR 19.4 10/28/2019       Total encounter time 25 minutes greater than 50% spent counseling/coordination of care.  I discussed the goals of diet and exercise with the patient. I discussed the goals of A1c, blood pressure, and cholesterol. I discussed the goals of blood sugar pre- and post meals with the patient.I encouraged the patient to rotate the injection sites.  I reviewed the patient's labs/imaging/medications/allergies/problem list.       Electronically signed by: JUSTO Yarbrough on 10/30/2019 2:05 PM

## 2019-10-29 LAB
ALBUMIN SERPL-MCNC: 4.3 G/DL (ref 3.6–4.8)
ALBUMIN/CREAT UR: 14.9 MG/G CREAT (ref 0–30)
ALBUMIN/GLOB SERPL: 1.7 {RATIO} (ref 1.2–2.2)
ALP SERPL-CCNC: 79 IU/L (ref 39–117)
ALT SERPL-CCNC: 11 IU/L (ref 0–32)
AST SERPL-CCNC: 12 IU/L (ref 0–40)
BILIRUB SERPL-MCNC: <0.2 MG/DL (ref 0–1.2)
BUN SERPL-MCNC: 15 MG/DL (ref 8–27)
BUN/CREAT SERPL: 17 (ref 12–28)
CALCIUM SERPL-MCNC: 9.3 MG/DL (ref 8.7–10.3)
CHLORIDE SERPL-SCNC: 101 MMOL/L (ref 96–106)
CHOLEST SERPL-MCNC: 230 MG/DL (ref 100–199)
CO2 SERPL-SCNC: 29 MMOL/L (ref 20–29)
CREAT SERPL-MCNC: 0.88 MG/DL (ref 0.57–1)
CREAT UR-MCNC: 129.9 MG/DL
GLOBULIN SER CALC-MCNC: 2.5 G/DL (ref 1.5–4.5)
GLUCOSE SERPL-MCNC: 104 MG/DL (ref 65–99)
HBA1C MFR BLD: 6.2 % (ref 4.8–5.6)
HDLC SERPL-MCNC: 53 MG/DL
LAB CORP EGFR IF AFRICN AM: 80 ML/MIN/1.73
LAB CORP EGFR IF NONAFRICN AM: 70 ML/MIN/1.73
LDLC SERPL CALC-MCNC: 147 MG/DL (ref 0–99)
MICROALBUMIN UR-MCNC: 19.4 UG/ML
POTASSIUM SERPL-SCNC: 5.3 MMOL/L (ref 3.5–5.2)
PROT SERPL-MCNC: 6.8 G/DL (ref 6–8.5)
SODIUM SERPL-SCNC: 142 MMOL/L (ref 134–144)
T4 FREE SERPL-MCNC: 1.3 NG/DL (ref 0.82–1.77)
TRIGL SERPL-MCNC: 149 MG/DL (ref 0–149)
TSH SERPL DL<=0.005 MIU/L-ACNC: 2.52 UIU/ML (ref 0.45–4.5)
VLDLC SERPL CALC-MCNC: 30 MG/DL (ref 5–40)

## 2019-10-30 ENCOUNTER — OFFICE VISIT (OUTPATIENT)
Dept: ENDOCRINOLOGY | Facility: CLINIC | Age: 64
End: 2019-10-30
Payer: COMMERCIAL

## 2019-10-30 VITALS
SYSTOLIC BLOOD PRESSURE: 132 MMHG | DIASTOLIC BLOOD PRESSURE: 80 MMHG | BODY MASS INDEX: 32.62 KG/M2 | HEIGHT: 56 IN | HEART RATE: 87 BPM | WEIGHT: 145 LBS

## 2019-10-30 DIAGNOSIS — E78.49 OTHER HYPERLIPIDEMIA: ICD-10-CM

## 2019-10-30 DIAGNOSIS — E11.9 DIABETES MELLITUS, STABLE (CMS/HCC): Primary | ICD-10-CM

## 2019-10-30 DIAGNOSIS — E66.09 CLASS 1 OBESITY DUE TO EXCESS CALORIES WITHOUT SERIOUS COMORBIDITY IN ADULT, UNSPECIFIED BMI: ICD-10-CM

## 2019-10-30 DIAGNOSIS — E66.811 CLASS 1 OBESITY DUE TO EXCESS CALORIES WITHOUT SERIOUS COMORBIDITY IN ADULT, UNSPECIFIED BMI: ICD-10-CM

## 2019-10-30 PROCEDURE — 99214 OFFICE O/P EST MOD 30 MIN: CPT | Performed by: NURSE PRACTITIONER

## 2019-10-30 RX ORDER — METFORMIN HYDROCHLORIDE 500 MG/1
500 TABLET ORAL 2 TIMES DAILY WITH MEALS
Qty: 60 TABLET | Refills: 3 | Status: SHIPPED | OUTPATIENT
Start: 2019-10-30 | End: 2020-04-15 | Stop reason: SDUPTHER

## 2019-10-30 NOTE — LETTER
October 30, 2019     ALVARO Harrington  [address]  [place] [address]    Patient: Edd Hammond  YOB: 1955  Date of Visit: 10/30/2019      Dear Dr. Byrd:    Thank you for referring Edd Hammond to me for evaluation. Below are my notes for this consultation.    If you have questions, please do not hesitate to call me. I look forward to following your patient along with you.         Sincerely,        JUSTO Yarbrough        CC: No Recipients  Yamilex Aguilar CRNP  10/30/2019  2:05 PM  Sign at close encounter                        Endocrinology  Note       PATIENTS: Edd Hammond  YOB: 1955  DATE: October 30, 2019  VISIT TYPE: follow up visit    History of Present Illness:   HPI 63 year old white female with type 2 diabetes since 2011.    She is currently on metformin 500 mg 2 tabs daily.  Her fasting blood sugars are .  She reports no hypoglycemia.  She does have a past medical history of sleep apnea she cannot use her CPAP asthma and  hypertension.  She does not smoke she is a  she works from her home she is  with no children no pregnancies.  Her past surgical history includes bilateral carpal tunnel release left shoulder repair colonoscopy .  She has been working with pulmonary and states she has been diagnosed with a paralyzed diaphragm. She was last seen by ophthalmology in 2018 and podiatry most recently.  She has been intolerant to statins and Zetia has been on Praluent 75 mg per month.     Medical History:  Past Medical History:   Diagnosis Date   • Arthritis    • Asthma    • Diverticulitis of colon    • High cholesterol    • Hypertension    • Type 2 diabetes mellitus (CMS/HCC)        Surgical History:   Past Surgical History:   Procedure Laterality Date   • CARPAL TUNNEL RELEASE     • ROTATOR CUFF REPAIR         Family History:  Family History   Problem Relation Age of Onset   • COPD Biological Mother    • Lung cancer  Biological Father        Social history:  Social History     Socioeconomic History   • Marital status:      Spouse name: Not on file   • Number of children: Not on file   • Years of education: Not on file   • Highest education level: Not on file   Occupational History   • Not on file   Social Needs   • Financial resource strain: Not on file   • Food insecurity:     Worry: Not on file     Inability: Not on file   • Transportation needs:     Medical: Not on file     Non-medical: Not on file   Tobacco Use   • Smoking status: Never Smoker   • Smokeless tobacco: Never Used   Substance and Sexual Activity   • Alcohol use: Yes   • Drug use: No   • Sexual activity: Not on file   Lifestyle   • Physical activity:     Days per week: Not on file     Minutes per session: Not on file   • Stress: Not on file   Relationships   • Social connections:     Talks on phone: Not on file     Gets together: Not on file     Attends Gnosticism service: Not on file     Active member of club or organization: Not on file     Attends meetings of clubs or organizations: Not on file     Relationship status: Not on file   • Intimate partner violence:     Fear of current or ex partner: Not on file     Emotionally abused: Not on file     Physically abused: Not on file     Forced sexual activity: Not on file   Other Topics Concern   • Not on file   Social History Narrative   • Not on file       Medication(active prior to today):  Current Outpatient Medications   Medication Sig Dispense Refill   • albuterol HFA (VENTOLIN HFA) 90 mcg/actuation inhaler Inhale 2 puffs every 6 (six) hours as needed.     • alirocumab 75 mg/mL pen injector Inject 75 mg under the skin every 30 (thirty) days. 3 pen 3   • budesonide-formoterol (SYMBICORT) 80-4.5 mcg/actuation inhaler Inhale 2 puffs 2 (two) times a day. Rinse mouth with water after use to reduce aftertaste and incidence of candidiasis. Do not swallow.     • cholecalciferol, vitamin D3, 5,000 unit tablet  "Take 5,000 Units by mouth daily.     • fexofenadine (ALLEGRA) 180 mg tablet Take 180 mg by mouth daily.     • FREESTYLE 28 gauge lancets Check blood sugar four times daily 400 each 3   • FREESTYLE LITE STRIPS strip 1 strip 4 (four) times a day. Dx code E 11.9 200 strip 11   • glucose 4 gram chewable tablet Take 16 g by mouth as needed for low blood sugar.     • lisinopril (PRINIVIL) 2.5 mg tablet Take 1 tablet (2.5 mg total) by mouth daily. 30 tablet 3   • meloxicam (MOBIC) 7.5 mg tablet Take 7.5 mg by mouth once daily.  1   • metFORMIN (GLUCOPHAGE) 500 mg tablet Take 1 tablet (500 mg total) by mouth 2 (two) times a day with meals. 60 tablet 3   • multivitamin tablet Take 1 tablet by mouth daily.     • RED YEAST RICE ORAL Take by mouth.     • riboflavin, vitamin B2, 100 mg tablet Take by mouth daily.       No current facility-administered medications for this visit.        Allergies:  Erythromycin    Review of Systems:   Constitutional: Negative for fatigue and unexpected weight change.   HENT: Negative for postnasal drip, trouble swallowing and voice change.    Eyes: Negative for visual disturbance.   Respiratory: + for apnea.    Cardiovascular: Negative for leg swelling.   Gastrointestinal: Negative for constipation, diarrhea and nausea.   Endocrine: Negative for polydipsia, polyphagia and polyuria.   Musculoskeletal: + for arthralgias, back pain, neck pain and neck stiffness.   Neurological: Negative for numbness and headaches.   Psychiatric/Behavioral: Negative for sleep disturbance.     Vitals Signs:  Vitals:    10/30/19 1331   BP: 132/80   BP Location: Right upper arm   Patient Position: Sitting   Pulse: 87   Weight: 65.8 kg (145 lb)   Height: 1.422 m (4' 8\")     Body mass index is 32.51 kg/m².      Physical Exam:  Constitutional: Patient is oriented to person, place, and time.  Appears well-developed and well-nourished.   Eyes: EOM are normal. Pupils are equal, round, and reactive to light.   Neck: Normal " range of motion. Neck supple.   Cardiovascular: Normal rate, regular rhythm and normal heart sounds.    Pulmonary/Chest: Effort normal and breath sounds normal.   Abdominal: Soft.   Musculoskeletal: Normal range of motion.   Neurological:  Alert and oriented to person, place, and time.   Skin: Skin is warm and dry.   Psychiatric:  Normal mood and affect. Behavior is normal. Judgment and thought content normal.       Assessment/Plan:  Diabetes mellitus, stable (CMS/HCC) (HCC)  1. A1c 6.2  2. Continue with Metformin 500 mg 2 tabs daily   3. Continue with diet and exercise  4. Call me for any low blood sugars   5. Call me for any steroids     Obesity due to excess calories without serious comorbidity  Continue with diet and exercise     Other hyperlipidemia  Continue with Praluent       Labs:  Lab Results   Component Value Date    WBC 7.37 08/06/2019    HGB 12.5 08/06/2019    HCT 38.7 08/06/2019     08/06/2019    CHOL 230 (H) 10/28/2019    TRIG 149 10/28/2019    HDL 53 10/28/2019    ALT 11 10/28/2019    AST 12 10/28/2019     10/28/2019    K 5.3 (H) 10/28/2019     10/28/2019    CREATININE 0.88 10/28/2019    BUN 15 10/28/2019    CO2 29 10/28/2019    TSH 2.520 10/28/2019    HGBA1C 6.2 (H) 10/28/2019    MICROALBUR 19.4 10/28/2019       Total encounter time 25 minutes greater than 50% spent counseling/coordination of care.  I discussed the goals of diet and exercise with the patient. I discussed the goals of A1c, blood pressure, and cholesterol. I discussed the goals of blood sugar pre- and post meals with the patient.I encouraged the patient to rotate the injection sites.  I reviewed the patient's labs/imaging/medications/allergies/problem list.       Electronically signed by: JUSTO Yarbrough on 10/30/2019 2:05 PM

## 2019-10-30 NOTE — PATIENT INSTRUCTIONS
Diabetes mellitus, stable (CMS/[facility]) ([facility])  1. A1c 6.2  2. Continue with Metformin 500 mg 2 tabs daily   3. Continue with diet and exercise  4. Call me for any low blood sugars   5. Call me for any steroids     Obesity due to excess calories without serious comorbidity  Continue with diet and exercise     Other hyperlipidemia  Continue with Praluent     Patient Education     Diabetes Mellitus and Exercise  Exercising regularly is important for your overall health, especially when you have diabetes (diabetes mellitus). Exercising is not only about losing weight. It has many other health benefits, such as increasing muscle strength and bone density and reducing body fat and stress. This leads to improved fitness, flexibility, and endurance, all of which result in better overall health.  Exercise has additional benefits for people with diabetes, including:  · Reducing appetite.  · Helping to lower and control blood glucose.  · Lowering blood pressure.  · Helping to control amounts of fatty substances (lipids) in the blood, such as cholesterol and triglycerides.  · Helping the body to respond better to insulin (improving insulin sensitivity).  · Reducing how much insulin the body needs.  · Decreasing the risk for heart disease by:  ? Lowering cholesterol and triglyceride levels.  ? Increasing the levels of good cholesterol.  ? Lowering blood glucose levels.  What is my activity plan?  Your health care provider or certified diabetes educator can help you make a plan for the type and frequency of exercise (activity plan) that works for you. Make sure that you:  · Do at least 150 minutes of moderate-intensity or vigorous-intensity exercise each week. This could be brisk walking, biking, or water aerobics.  ? Do stretching and strength exercises, such as yoga or weightlifting, at least 2 times a week.  ? Spread out your activity over at least 3 days of the week.  · Get some form of physical activity every day.  ? Do not go  more than 2 days in a row without some kind of physical activity.  ? Avoid being inactive for more than 30 minutes at a time. Take frequent breaks to walk or stretch.  · Choose a type of exercise or activity that you enjoy, and set realistic goals.  · Start slowly, and gradually increase the intensity of your exercise over time.  What do I need to know about managing my diabetes?    · Check your blood glucose before and after exercising.  ? If your blood glucose is 240 mg/dL (13.3 mmol/L) or higher before you exercise, check your urine for ketones. If you have ketones in your urine, do not exercise until your blood glucose returns to normal.  ? If your blood glucose is 100 mg/dL (5.6 mmol/L) or lower, eat a snack containing 15-20 grams of carbohydrate. Check your blood glucose 15 minutes after the snack to make sure that your level is above 100 mg/dL (5.6 mmol/L) before you start your exercise.  · Know the symptoms of low blood glucose (hypoglycemia) and how to treat it. Your risk for hypoglycemia increases during and after exercise. Common symptoms of hypoglycemia can include:  ? Hunger.  ? Anxiety.  ? Sweating and feeling clammy.  ? Confusion.  ? Dizziness or feeling light-headed.  ? Increased heart rate or palpitations.  ? Blurry vision.  ? Tingling or numbness around the mouth, lips, or tongue.  ? Tremors or shakes.  ? Irritability.  · Keep a rapid-acting carbohydrate snack available before, during, and after exercise to help prevent or treat hypoglycemia.  · Avoid injecting insulin into areas of the body that are going to be exercised. For example, avoid injecting insulin into:  ? The arms, when playing tennis.  ? The legs, when jogging.  · Keep records of your exercise habits. Doing this can help you and your health care provider adjust your diabetes management plan as needed. Write down:  ? Food that you eat before and after you exercise.  ? Blood glucose levels before and after you exercise.  ? The type and  amount of exercise you have done.  ? When your insulin is expected to peak, if you use insulin. Avoid exercising at times when your insulin is peaking.  · When you start a new exercise or activity, work with your health care provider to make sure the activity is safe for you, and to adjust your insulin, medicines, or food intake as needed.  · Drink plenty of water while you exercise to prevent dehydration or heat stroke. Drink enough fluid to keep your urine clear or pale yellow.  Summary  · Exercising regularly is important for your overall health, especially when you have diabetes (diabetes mellitus).  · Exercising has many health benefits, such as increasing muscle strength and bone density and reducing body fat and stress.  · Your health care provider or certified diabetes educator can help you make a plan for the type and frequency of exercise (activity plan) that works for you.  · When you start a new exercise or activity, work with your health care provider to make sure the activity is safe for you, and to adjust your insulin, medicines, or food intake as needed.  This information is not intended to replace advice given to you by your health care provider. Make sure you discuss any questions you have with your health care provider.  Document Released: 03/09/2005 Document Revised: 06/28/2018 Document Reviewed: 05/29/2017  [employer] Patient Education © 2019 [employer]

## 2019-12-16 ENCOUNTER — HOSPITAL ENCOUNTER (OUTPATIENT)
Dept: RADIOLOGY | Age: 64
Discharge: HOME | End: 2019-12-16
Attending: PHYSICIAN ASSISTANT
Payer: COMMERCIAL

## 2019-12-16 ENCOUNTER — APPOINTMENT (OUTPATIENT)
Dept: LAB | Age: 64
End: 2019-12-16
Attending: PHYSICIAN ASSISTANT
Payer: COMMERCIAL

## 2019-12-16 ENCOUNTER — TRANSCRIBE ORDERS (OUTPATIENT)
Dept: LAB | Age: 64
End: 2019-12-16

## 2019-12-16 DIAGNOSIS — M25.562 PAIN IN LEFT KNEE: Primary | ICD-10-CM

## 2019-12-16 DIAGNOSIS — M25.562 PAIN IN LEFT KNEE: ICD-10-CM

## 2019-12-16 LAB
ERYTHROCYTE [SEDIMENTATION RATE] IN BLOOD BY WESTERGREN METHOD: 17 MM/HR
URATE SERPL-MCNC: 3.8 MG/DL (ref 2.6–8)

## 2019-12-16 PROCEDURE — 36415 COLL VENOUS BLD VENIPUNCTURE: CPT

## 2019-12-16 PROCEDURE — 86431 RHEUMATOID FACTOR QUANT: CPT

## 2019-12-16 PROCEDURE — 86618 LYME DISEASE ANTIBODY: CPT

## 2019-12-16 PROCEDURE — 85652 RBC SED RATE AUTOMATED: CPT

## 2019-12-16 PROCEDURE — 86666 EHRLICHIA ANTIBODY: CPT

## 2019-12-16 PROCEDURE — 73564 X-RAY EXAM KNEE 4 OR MORE: CPT | Mod: LT

## 2019-12-16 PROCEDURE — 86038 ANTINUCLEAR ANTIBODIES: CPT

## 2019-12-16 PROCEDURE — 86753 PROTOZOA ANTIBODY NOS: CPT

## 2019-12-16 PROCEDURE — 84550 ASSAY OF BLOOD/URIC ACID: CPT

## 2019-12-17 LAB — RHEUMATOID FACT SERPL-ACNC: 8 IU/ML

## 2019-12-18 LAB
ANA SER QL IA: NEGATIVE
B BURGDOR AB SER IA-ACNC: 0.09 RATIO
E CHAFFEENSIS IGG TITR SER IF: NORMAL {TITER}
E CHAFFEENSIS IGG+IGM SERPL QL: NORMAL
E CHAFFEENSIS IGM TITR SER IF: NORMAL {TITER}
SERVICE CMNT-IMP: NORMAL

## 2019-12-22 LAB
B MICROTI IGG TITR SER: NORMAL TITER
B MICROTI IGM TITR SER: NORMAL TITER
QST B MICROT ABS INTERPRETATION: NORMAL

## 2019-12-23 ENCOUNTER — TRANSCRIBE ORDERS (OUTPATIENT)
Dept: SCHEDULING | Age: 64
End: 2019-12-23

## 2019-12-23 DIAGNOSIS — M25.562 PAIN IN LEFT KNEE: Primary | ICD-10-CM

## 2019-12-30 ENCOUNTER — HOSPITAL ENCOUNTER (OUTPATIENT)
Dept: RADIOLOGY | Age: 64
Discharge: HOME | End: 2019-12-30
Attending: FAMILY MEDICINE
Payer: COMMERCIAL

## 2019-12-30 DIAGNOSIS — M25.562 PAIN IN LEFT KNEE: ICD-10-CM

## 2019-12-31 ENCOUNTER — TELEPHONE (OUTPATIENT)
Dept: ENDOCRINOLOGY | Facility: CLINIC | Age: 64
End: 2019-12-31

## 2019-12-31 RX ORDER — BLOOD-GLUCOSE METER
1 KIT MISCELLANEOUS 4 TIMES DAILY
Qty: 200 STRIP | Refills: 11 | Status: SHIPPED | OUTPATIENT
Start: 2019-12-31 | End: 2020-08-11 | Stop reason: SDUPTHER

## 2019-12-31 RX ORDER — LISINOPRIL 2.5 MG/1
2.5 TABLET ORAL DAILY
Qty: 30 TABLET | Refills: 3 | Status: SHIPPED | OUTPATIENT
Start: 2019-12-31

## 2020-01-07 ENCOUNTER — TELEPHONE (OUTPATIENT)
Dept: OPERATING ROOM | Facility: HOSPITAL | Age: 65
End: 2020-01-07

## 2020-01-07 NOTE — TELEPHONE ENCOUNTER
Spoke with patient and she cancelled upcoming appointment that was originally scheduled for 1/14/2020.  She had not received the notification that we were moving this appointment anyway.    She says that she needs to cancel as she has other medical issues that she must have taken care of.  Also, she is feeling a little better.  She will call back if needed.

## 2020-04-15 RX ORDER — METFORMIN HYDROCHLORIDE 500 MG/1
500 TABLET ORAL 2 TIMES DAILY WITH MEALS
Qty: 60 TABLET | Refills: 3 | Status: SHIPPED | OUTPATIENT
Start: 2020-04-15 | End: 2020-08-11 | Stop reason: SDUPTHER

## 2020-04-23 ENCOUNTER — HOSPITAL ENCOUNTER (EMERGENCY)
Facility: HOSPITAL | Age: 65
Discharge: HOME | End: 2020-04-23
Attending: EMERGENCY MEDICINE
Payer: COMMERCIAL

## 2020-04-23 VITALS
OXYGEN SATURATION: 97 % | DIASTOLIC BLOOD PRESSURE: 78 MMHG | RESPIRATION RATE: 18 BRPM | HEIGHT: 56 IN | TEMPERATURE: 100 F | WEIGHT: 146 LBS | BODY MASS INDEX: 32.84 KG/M2 | HEART RATE: 80 BPM | SYSTOLIC BLOOD PRESSURE: 141 MMHG

## 2020-04-23 DIAGNOSIS — R04.0 EPISTAXIS: Primary | ICD-10-CM

## 2020-04-23 PROCEDURE — 25000000 HC PHARMACY GENERAL: Performed by: PHYSICIAN ASSISTANT

## 2020-04-23 PROCEDURE — 99281 EMR DPT VST MAYX REQ PHY/QHP: CPT

## 2020-04-23 PROCEDURE — 63700000 HC SELF-ADMINISTRABLE DRUG: Performed by: PHYSICIAN ASSISTANT

## 2020-04-23 RX ADMIN — COCAINE HYDROCHLORIDE 4 ML: 40 SOLUTION NASAL at 16:39

## 2020-04-23 RX ADMIN — TRANEXAMIC ACID 500 MG: 100 INJECTION, SOLUTION INTRAVENOUS at 17:15

## 2020-04-23 ASSESSMENT — ENCOUNTER SYMPTOMS
WEAKNESS: 0
SHORTNESS OF BREATH: 0
CHILLS: 0
COUGH: 0
FEVER: 0
DIZZINESS: 0
LIGHT-HEADEDNESS: 0

## 2020-04-23 NOTE — DISCHARGE INSTRUCTIONS
You have been evaluated for a nosebleed.  Her symptoms are now resolved with topical medications.  Do not touch your nose for the next 2 days.  Do not blow your nose.  We recommend decreasing her medications were spraying into your nose.  Sleep at night with a humidifier next to your bed.  Please closely follow-up with your primary care evaluation for reevaluation of your symptoms.  You have also been given the name of an ENT (ears, nose, throat) physician to follow-up with if your symptoms continue.    If bleeding recurs, hold the soft part of your nose for 15 minutes while leaning forward.  Seek emergent medical attention for worsening symptoms, dizziness, lightheadedness, bleeding from your gums, blood in your stool, or any other new/concerning symptoms.

## 2020-04-23 NOTE — ED ATTESTATION NOTE
I have personally seen and examined the patient.  I reviewed and agree with physician assistant / nurse practitioner’s assessment and plan of care, with the following exceptions: None  My examination, assessment, and plan of care of Edd Hammond is as follows:     64-year-old female who comes in with nosebleed from the left nostril.  She has had it a few times this week but was able to treated at home.  Does have a history of allergies.  No other complaints.    Well-developed female no obvious distress.  HEENT is notable some old blood in the left nare.  No significant active bleeding.    Patient had cocaine pledget placed by the PA.  The area was examined no active bleeding.  The TXA pledget was placed.  If bleeding continues to be under control the patient be discharged home.  Agree with PA findings and plan.     Martin Asencio MD  04/23/20 0122

## 2020-04-23 NOTE — ED PROVIDER NOTES
HPI     Chief Complaint   Patient presents with   • Epistaxis (Nose Bleed)       64 year old F with pmhx of T2DM, HTN, HLD, and asthma presents for evaluaiton of epistaxis from the left nare that began just PTA.  This is the third episode of epistaxis coming from the left nare in the past week.  Patient contributes this to working in a very dry basement over the past several days.  She has been able to control the epistaxis at home with pressure.  This is the first ED visit for an episode of epistaxis.  She was seen by her PCP for these symptoms and recommend begin Flonase.  She states that epistaxis today was preceded by rubbing her nose.  She does not take any blood thinners or ASA.            Patient History     Past Medical History:   Diagnosis Date   • Arthritis    • Asthma    • Diverticulitis of colon    • High cholesterol    • Hypertension    • Type 2 diabetes mellitus (CMS/HCC)        Past Surgical History:   Procedure Laterality Date   • CARPAL TUNNEL RELEASE     • ROTATOR CUFF REPAIR         Family History   Problem Relation Age of Onset   • COPD Biological Mother    • Lung cancer Biological Father        Social History     Tobacco Use   • Smoking status: Never Smoker   • Smokeless tobacco: Never Used   Substance Use Topics   • Alcohol use: Yes   • Drug use: No       Systems Reviewed from Nursing Triage:          Review of Systems     Review of Systems   Constitutional: Negative for chills and fever.   HENT: Positive for nosebleeds.    Respiratory: Negative for cough and shortness of breath.    Cardiovascular: Negative for chest pain.   Neurological: Negative for dizziness, weakness and light-headedness.        Physical Exam     ED Triage Vitals   Temp Pulse Resp BP SpO2   -- -- -- -- --      Temp src Heart Rate Source Patient Position BP Location FiO2 (%) (Set)   -- -- -- -- --                     No data found.                                       Physical Exam   Constitutional: She is oriented to  person, place, and time. She appears well-developed and well-nourished. No distress.   HENT:   Head: Normocephalic and atraumatic.   + oozing of blood from the left nare   Right nare is WNL  No blood noted in the posterior oropharynx    Eyes: Conjunctivae and EOM are normal.   Neck: Normal range of motion. Neck supple.   Pulmonary/Chest: Effort normal.   Musculoskeletal: Normal range of motion. She exhibits no deformity.   Neurological: She is alert and oriented to person, place, and time.   Skin: Skin is warm and dry. She is not diaphoretic.   Psychiatric: She has a normal mood and affect. Her behavior is normal.   Nursing note and vitals reviewed.           Procedures    ED Course & MDM     Labs Reviewed - No data to display    No orders to display               MDM         ED Course as of Apr 23 1846   Thu Apr 23, 2020   1643 Impression: epistaxis from the left nare  Plan: topical cocaine, topical TXA versus cautery     [CW]   1708 Reevaluated the patient after topical cocaine.  TXA ordered for further treatment.    [CW]   1737 TXA applied at 5:33 PM -- will re-eval in 20 minutes     [CW]   1810 Remove TXA at this time.  Patient is having some blood in the posterior pharynx now.  Attending to go to the bedside for evaluation.    [CW]   1818 Bleeding controlled - will observe for several minutes in case of rebleeding.     [CW]   1827 Bleeding remains controlled.  Will discharge patient home at this time.  She has been given instructions and return precautions.  She verbalizes understanding and agrees with plan.    [CW]      ED Course User Index  [CW] Lolita Shahid PA C         Clinical Impressions as of Apr 23 1846   Epistaxis        Lolita Shahid PA C  04/23/20 1846

## 2020-04-28 ENCOUNTER — HOSPITAL ENCOUNTER (EMERGENCY)
Facility: HOSPITAL | Age: 65
Discharge: HOME | End: 2020-04-28
Attending: EMERGENCY MEDICINE
Payer: COMMERCIAL

## 2020-04-28 VITALS
RESPIRATION RATE: 18 BRPM | DIASTOLIC BLOOD PRESSURE: 90 MMHG | SYSTOLIC BLOOD PRESSURE: 140 MMHG | HEART RATE: 82 BPM | TEMPERATURE: 98.6 F | OXYGEN SATURATION: 98 %

## 2020-04-28 DIAGNOSIS — R04.0 EPISTAXIS: Primary | ICD-10-CM

## 2020-04-28 LAB
ANION GAP SERPL CALC-SCNC: 11 MEQ/L (ref 3–15)
BASOPHILS # BLD: 0.05 K/UL (ref 0.01–0.1)
BASOPHILS NFR BLD: 0.7 %
BUN SERPL-MCNC: 17 MG/DL (ref 8–20)
CALCIUM SERPL-MCNC: 8.9 MG/DL (ref 8.9–10.3)
CHLORIDE SERPL-SCNC: 101 MEQ/L (ref 98–109)
CO2 SERPL-SCNC: 25 MEQ/L (ref 22–32)
CREAT SERPL-MCNC: 0.8 MG/DL (ref 0.6–1.1)
DIFFERENTIAL METHOD BLD: NORMAL
EOSINOPHIL # BLD: 0.1 K/UL (ref 0.04–0.36)
EOSINOPHIL NFR BLD: 1.3 %
ERYTHROCYTE [DISTWIDTH] IN BLOOD BY AUTOMATED COUNT: 13.1 % (ref 11.7–14.4)
GFR SERPL CREATININE-BSD FRML MDRD: >60 ML/MIN/1.73M*2
GLUCOSE SERPL-MCNC: 134 MG/DL (ref 70–99)
HCT VFR BLDCO AUTO: 38.2 % (ref 35–45)
HGB BLD-MCNC: 12.5 G/DL (ref 11.8–15.7)
IMM GRANULOCYTES # BLD AUTO: 0.03 K/UL (ref 0–0.08)
IMM GRANULOCYTES NFR BLD AUTO: 0.4 %
LYMPHOCYTES # BLD: 1.33 K/UL (ref 1.2–3.5)
LYMPHOCYTES NFR BLD: 17.4 %
MCH RBC QN AUTO: 29.3 PG (ref 28–33.2)
MCHC RBC AUTO-ENTMCNC: 32.7 G/DL (ref 32.2–35.5)
MCV RBC AUTO: 89.5 FL (ref 83–98)
MONOCYTES # BLD: 0.39 K/UL (ref 0.28–0.8)
MONOCYTES NFR BLD: 5.1 %
NEUTROPHILS # BLD: 5.74 K/UL (ref 1.7–7)
NEUTS SEG NFR BLD: 75.1 %
NRBC BLD-RTO: 0 %
PDW BLD AUTO: 9.6 FL (ref 9.4–12.3)
PLATELET # BLD AUTO: 224 K/UL (ref 150–369)
POTASSIUM SERPL-SCNC: 4.1 MEQ/L (ref 3.6–5.1)
RBC # BLD AUTO: 4.27 M/UL (ref 3.93–5.22)
SODIUM SERPL-SCNC: 137 MEQ/L (ref 136–144)
WBC # BLD AUTO: 7.64 K/UL (ref 3.8–10.5)

## 2020-04-28 PROCEDURE — 36415 COLL VENOUS BLD VENIPUNCTURE: CPT

## 2020-04-28 PROCEDURE — 63700000 HC SELF-ADMINISTRABLE DRUG: Performed by: PHYSICIAN ASSISTANT

## 2020-04-28 PROCEDURE — 85025 COMPLETE CBC W/AUTO DIFF WBC: CPT | Performed by: EMERGENCY MEDICINE

## 2020-04-28 PROCEDURE — 80048 BASIC METABOLIC PNL TOTAL CA: CPT | Performed by: EMERGENCY MEDICINE

## 2020-04-28 PROCEDURE — 093K7ZZ CONTROL BLEEDING IN NASAL MUCOSA AND SOFT TISSUE, VIA NATURAL OR ARTIFICIAL OPENING: ICD-10-PCS | Performed by: EMERGENCY MEDICINE

## 2020-04-28 PROCEDURE — 25000000 HC PHARMACY GENERAL: Performed by: PHYSICIAN ASSISTANT

## 2020-04-28 PROCEDURE — 30901 CONTROL OF NOSEBLEED: CPT | Mod: LT

## 2020-04-28 PROCEDURE — 99283 EMERGENCY DEPT VISIT LOW MDM: CPT | Mod: 25

## 2020-04-28 RX ORDER — CHOLECALCIFEROL (VITAMIN D3) 25 MCG
1000 TABLET,CHEWABLE ORAL DAILY
COMMUNITY

## 2020-04-28 RX ADMIN — TRANEXAMIC ACID 500 MG: 100 INJECTION, SOLUTION INTRAVENOUS at 10:45

## 2020-04-28 RX ADMIN — COCAINE HYDROCHLORIDE 4 ML: 40 SOLUTION TOPICAL at 10:45

## 2020-04-28 ASSESSMENT — ENCOUNTER SYMPTOMS
PALPITATIONS: 0
WEAKNESS: 0
CHILLS: 0
COLOR CHANGE: 0
NUMBNESS: 0
NAUSEA: 0
SHORTNESS OF BREATH: 0
FEVER: 0
CONSTIPATION: 0
HEADACHES: 1
ABDOMINAL PAIN: 0
CHEST TIGHTNESS: 0
DIARRHEA: 0
BACK PAIN: 0
DIZZINESS: 0
FATIGUE: 1
COUGH: 0
ARTHRALGIAS: 0
SINUS PAIN: 1
VOMITING: 0

## 2020-04-28 NOTE — ED ATTESTATION NOTE
Procedures  Physical Exam  Review of Systems    4/28/202010:21 AM  I have personally seen and examined the patient.  I reviewed and agree with the PA/NP/Resident's assessment and plan of care.    My examination, assessment, and plan of care of Edd Hammond is as follows:  The patient presents with over a week and nosebleeds.  Patient was recently seen here 5 days ago and had cautery.  Patient restarted the bleeding today.  Exam: Some active bleeding coming out of the left nare.  We are unable to visualize the source of the bleeding  Impression/Plan: Our plan is to attempt cautery and if unsuccessful then pack the patient.    Vital signs have been ordered and reviewed. The oxygen saturation is normal.     I was physically present for the key/critical portions of the following procedures: None    This document was created using dragon dictation software.  There might be some typographical errors due to this technology.     Leandro Hernandez MD  04/28/20 1022

## 2020-04-28 NOTE — DISCHARGE INSTRUCTIONS
Today we used TXA and intranasal cocaine, and merocel packing.     Try to keep inside of nose moist to prevent re-bleeding by:  -Use humidifier in the home  -Over the counter saline nasal spray 4x daily      Avoid heavy lifting, strenuous activity, forward bending. Try not to blow your nose if possible. Sneeze through your mouth.     Follow up with your ENT doctor tomorrow as scheduled.     Return to ER on Friday for packing removal.     If you experience re- bleeding, hold pressure for 15 minutes. If you are still bleeding, return to the ER.

## 2020-04-30 ENCOUNTER — TRANSCRIBE ORDERS (OUTPATIENT)
Dept: SCHEDULING | Age: 65
End: 2020-04-30

## 2020-04-30 DIAGNOSIS — G44.1 VASCULAR HEADACHE, NOT ELSEWHERE CLASSIFIED: ICD-10-CM

## 2020-04-30 DIAGNOSIS — R04.0 EPISTAXIS: Primary | ICD-10-CM

## 2020-05-01 ENCOUNTER — HOSPITAL ENCOUNTER (EMERGENCY)
Facility: HOSPITAL | Age: 65
Discharge: HOME | End: 2020-05-01
Attending: EMERGENCY MEDICINE
Payer: MEDICARE

## 2020-05-01 ENCOUNTER — TRANSCRIBE ORDERS (OUTPATIENT)
Dept: REGISTRATION | Facility: HOSPITAL | Age: 65
End: 2020-05-01

## 2020-05-01 ENCOUNTER — HOSPITAL ENCOUNTER (OUTPATIENT)
Dept: RADIOLOGY | Facility: HOSPITAL | Age: 65
Discharge: HOME | End: 2020-05-01
Attending: FAMILY MEDICINE
Payer: MEDICARE

## 2020-05-01 ENCOUNTER — APPOINTMENT (EMERGENCY)
Dept: LAB | Facility: HOSPITAL | Age: 65
End: 2020-05-01
Attending: PHYSICIAN ASSISTANT
Payer: MEDICARE

## 2020-05-01 VITALS
SYSTOLIC BLOOD PRESSURE: 146 MMHG | OXYGEN SATURATION: 96 % | DIASTOLIC BLOOD PRESSURE: 70 MMHG | BODY MASS INDEX: 32.62 KG/M2 | HEIGHT: 56 IN | WEIGHT: 145 LBS | TEMPERATURE: 97.5 F | HEART RATE: 90 BPM | RESPIRATION RATE: 18 BRPM

## 2020-05-01 DIAGNOSIS — R53.83 OTHER FATIGUE: ICD-10-CM

## 2020-05-01 DIAGNOSIS — E78.00 PURE HYPERCHOLESTEROLEMIA: ICD-10-CM

## 2020-05-01 DIAGNOSIS — Z00.00 ENCOUNTER FOR GENERAL ADULT MEDICAL EXAMINATION WITHOUT ABNORMAL FINDINGS: ICD-10-CM

## 2020-05-01 DIAGNOSIS — R73.01 IMPAIRED FASTING GLUCOSE: ICD-10-CM

## 2020-05-01 DIAGNOSIS — M25.541 PAIN IN JOINTS OF RIGHT HAND: ICD-10-CM

## 2020-05-01 DIAGNOSIS — G44.1 VASCULAR HEADACHE, NOT ELSEWHERE CLASSIFIED: ICD-10-CM

## 2020-05-01 DIAGNOSIS — R04.0 EPISTAXIS: ICD-10-CM

## 2020-05-01 DIAGNOSIS — Z00.00 ENCOUNTER FOR GENERAL ADULT MEDICAL EXAMINATION WITHOUT ABNORMAL FINDINGS: Primary | ICD-10-CM

## 2020-05-01 DIAGNOSIS — E55.9 VITAMIN D DEFICIENCY, UNSPECIFIED: ICD-10-CM

## 2020-05-01 DIAGNOSIS — I10 ESSENTIAL (PRIMARY) HYPERTENSION: ICD-10-CM

## 2020-05-01 DIAGNOSIS — Z48.00 ENCOUNTER FOR REMOVAL OF NASAL PACKING: Primary | ICD-10-CM

## 2020-05-01 DIAGNOSIS — E03.9 HYPOTHYROIDISM, UNSPECIFIED: ICD-10-CM

## 2020-05-01 LAB
25(OH)D3 SERPL-MCNC: 30 NG/ML (ref 30–100)
ALBUMIN SERPL-MCNC: 3.7 G/DL (ref 3.4–5)
ALP SERPL-CCNC: 68 IU/L (ref 35–126)
ALT SERPL-CCNC: 14 IU/L (ref 11–54)
ANION GAP SERPL CALC-SCNC: 10 MEQ/L (ref 3–15)
APTT PPP: 39 SEC (ref 23–35)
AST SERPL-CCNC: 17 IU/L (ref 15–41)
BACTERIA URNS QL MICRO: ABNORMAL /HPF
BASOPHILS # BLD: 0.05 K/UL (ref 0.01–0.1)
BASOPHILS NFR BLD: 0.5 %
BILIRUB SERPL-MCNC: 0.4 MG/DL (ref 0.3–1.2)
BILIRUB UR QL STRIP.AUTO: NEGATIVE MG/DL
BUN SERPL-MCNC: 9 MG/DL (ref 8–20)
CALCIUM SERPL-MCNC: 9 MG/DL (ref 8.9–10.3)
CAOX CRY URNS QL MICRO: ABNORMAL /HPF
CHLORIDE SERPL-SCNC: 101 MEQ/L (ref 98–109)
CHOLEST SERPL-MCNC: 213 MG/DL
CLARITY UR REFRACT.AUTO: CLEAR
CO2 SERPL-SCNC: 28 MEQ/L (ref 22–32)
COLOR UR AUTO: ABNORMAL
CREAT SERPL-MCNC: 0.9 MG/DL (ref 0.6–1.1)
DIFFERENTIAL METHOD BLD: ABNORMAL
EOSINOPHIL # BLD: 0.13 K/UL (ref 0.04–0.36)
EOSINOPHIL NFR BLD: 1.3 %
ERYTHROCYTE [DISTWIDTH] IN BLOOD BY AUTOMATED COUNT: 13.2 % (ref 11.7–14.4)
ERYTHROCYTE [SEDIMENTATION RATE] IN BLOOD BY WESTERGREN METHOD: 65 MM/HR
EST. AVERAGE GLUCOSE BLD GHB EST-MCNC: 131 MG/DL
GFR SERPL CREATININE-BSD FRML MDRD: >60 ML/MIN/1.73M*2
GLUCOSE SERPL-MCNC: 132 MG/DL (ref 70–99)
GLUCOSE UR STRIP.AUTO-MCNC: NEGATIVE MG/DL
HBA1C MFR BLD HPLC: 6.2 %
HCT VFR BLDCO AUTO: 34.3 % (ref 35–45)
HDLC SERPL-MCNC: 54 MG/DL
HDLC SERPL: 3.9 {RATIO}
HGB BLD-MCNC: 11 G/DL (ref 11.8–15.7)
HGB UR QL STRIP.AUTO: NEGATIVE
HYALINE CASTS #/AREA URNS LPF: ABNORMAL /LPF
IMM GRANULOCYTES # BLD AUTO: 0.03 K/UL (ref 0–0.08)
IMM GRANULOCYTES NFR BLD AUTO: 0.3 %
INR PPP: 1.1 INR
KETONES UR STRIP.AUTO-MCNC: NEGATIVE MG/DL
LDLC SERPL CALC-MCNC: 139 MG/DL
LEUKOCYTE ESTERASE UR QL STRIP.AUTO: NEGATIVE
LYMPHOCYTES # BLD: 1.51 K/UL (ref 1.2–3.5)
LYMPHOCYTES NFR BLD: 15.5 %
MCH RBC QN AUTO: 29.3 PG (ref 28–33.2)
MCHC RBC AUTO-ENTMCNC: 32.1 G/DL (ref 32.2–35.5)
MCV RBC AUTO: 91.2 FL (ref 83–98)
MONOCYTES # BLD: 0.7 K/UL (ref 0.28–0.8)
MONOCYTES NFR BLD: 7.2 %
NEUTROPHILS # BLD: 7.33 K/UL (ref 1.7–7)
NEUTS SEG NFR BLD: 75.2 %
NITRITE UR QL STRIP.AUTO: NEGATIVE
NONHDLC SERPL-MCNC: 159 MG/DL
NRBC BLD-RTO: 0 %
PDW BLD AUTO: 10.3 FL (ref 9.4–12.3)
PH UR STRIP.AUTO: 5.5 [PH]
PLATELET # BLD AUTO: 232 K/UL (ref 150–369)
POTASSIUM SERPL-SCNC: 4 MEQ/L (ref 3.6–5.1)
PROT SERPL-MCNC: 6.8 G/DL (ref 6–8.2)
PROT UR QL STRIP.AUTO: ABNORMAL
PROTHROMBIN TIME: 14.2 SEC (ref 12.2–14.5)
RBC # BLD AUTO: 3.76 M/UL (ref 3.93–5.22)
RBC #/AREA URNS HPF: ABNORMAL /HPF
RHEUMATOID FACT SERPL-ACNC: 8 IU/ML
SODIUM SERPL-SCNC: 139 MEQ/L (ref 136–144)
SP GR UR REFRACT.AUTO: 1.03
SQUAMOUS URNS QL MICRO: 1 /HPF
TRIGL SERPL-MCNC: 100 MG/DL (ref 30–149)
TSH SERPL DL<=0.05 MIU/L-ACNC: 1.52 MIU/L (ref 0.34–5.6)
UROBILINOGEN UR STRIP-ACNC: 0.2 EU/DL
WBC # BLD AUTO: 9.75 K/UL (ref 3.8–10.5)
WBC #/AREA URNS HPF: ABNORMAL /HPF

## 2020-05-01 PROCEDURE — 85652 RBC SED RATE AUTOMATED: CPT

## 2020-05-01 PROCEDURE — 99281 EMR DPT VST MAYX REQ PHY/QHP: CPT

## 2020-05-01 PROCEDURE — 86431 RHEUMATOID FACTOR QUANT: CPT

## 2020-05-01 PROCEDURE — 99284 EMERGENCY DEPT VISIT MOD MDM: CPT | Mod: 25

## 2020-05-01 PROCEDURE — 80061 LIPID PANEL: CPT

## 2020-05-01 PROCEDURE — 84443 ASSAY THYROID STIM HORMONE: CPT

## 2020-05-01 PROCEDURE — 82306 VITAMIN D 25 HYDROXY: CPT

## 2020-05-01 PROCEDURE — 85025 COMPLETE CBC W/AUTO DIFF WBC: CPT

## 2020-05-01 PROCEDURE — 86038 ANTINUCLEAR ANTIBODIES: CPT

## 2020-05-01 PROCEDURE — 83036 HEMOGLOBIN GLYCOSYLATED A1C: CPT

## 2020-05-01 PROCEDURE — 80053 COMPREHEN METABOLIC PANEL: CPT

## 2020-05-01 PROCEDURE — 86618 LYME DISEASE ANTIBODY: CPT

## 2020-05-01 PROCEDURE — 85610 PROTHROMBIN TIME: CPT

## 2020-05-01 PROCEDURE — 85730 THROMBOPLASTIN TIME PARTIAL: CPT

## 2020-05-01 PROCEDURE — 36415 COLL VENOUS BLD VENIPUNCTURE: CPT

## 2020-05-01 PROCEDURE — 81001 URINALYSIS AUTO W/SCOPE: CPT

## 2020-05-01 PROCEDURE — 70486 CT MAXILLOFACIAL W/O DYE: CPT

## 2020-05-01 ASSESSMENT — ENCOUNTER SYMPTOMS
VOMITING: 0
NAUSEA: 0
COUGH: 0
CONFUSION: 0
BRUISES/BLEEDS EASILY: 0
HEADACHES: 0
DIZZINESS: 0
SHORTNESS OF BREATH: 0
FEVER: 0

## 2020-05-01 NOTE — ED ATTESTATION NOTE
The patient was evaluated and managed by the physician assistant / nurse practitioner. Discussed complaint, exam and results with pa/np and agree with assessment and plan. I was available to see the pt at the request of the pa/np or pt request.      Brent Littlejohn MD  05/01/20 0690

## 2020-05-01 NOTE — DISCHARGE INSTRUCTIONS
Continue to get your CT scan of your nasal sinuses per your ENT.  Please follow-up with ENT on Monday.      Do not stick anything up your nose and do not remove clot from nose.  Avoid blowing your nose to prevent re-bleeding  If your nose starts to bleed, pinch your nose at the fleshy portion and tilt your head forward for 15 minutes or until the bleeding stops.   To prevent bleeds in the future, consider using a humidifier or nasal saline sprays  Please apply Vaseline to your inner nose to prevent rebleeding.  No nose blowing. No rubbing your nose.  No straining.  Sneeze through your mouth.  Nosebleed self-care -- With the right self-care, most nosebleeds stop on their own. Here's what you should do:  1. Blow your nose. This might increase the bleeding for a moment, but that's OK.  2. Sit or stand while bending forward alittle at the waist. DO NOT lie down or tilt your head back.  3. Spray afrin into the side of your nose that is bleeding. Pinch the soft area towards the bottom of your nose, below the bone. Do NOT  the bridge of your nose between your eyes. That will not work. DO NOT press on just 1 side, even if the bleeding is only on 1 side. That will not work either.  4. Squeeze your nose shut for at least 15 minutes. (In children, squeeze for only 5 minutes.) Use a clock to time yourself. Do not release the pressure before the time is up to check if the bleeding has stopped. If you keep checking, you will ruin your chances of getting the bleeding to stop.  If you follow these steps, and your nose keeps bleeding, repeat all the steps once more. Apply pressure for a total of at least 30 minutes (or 10 minutes for children). If you are still bleeding, go to the emergency room or an urgent care clinic.  Please follow-up with your family provider.  Please return to the ER for any new or worsening symptoms.

## 2020-05-01 NOTE — ED PROVIDER NOTES
HPI     64-year-old female presents emergency department via private vehicle for Merocel removal from her left nare.  Patient had recurrent epistaxis due to rubbing her nose from her seasonal allergies.  Patient had multiple attempts at controlling her nosebleed without success so Merisel was placed into her left nare on 4/28/2020 with plan for follow-up with ENT the following day and packing removal in 3 days.  Patient is not on any blood thinners.  Patient is on allergy medication, Claritin.  Has held her Flonase.  Patient had difficulty obtaining an ENT appointment but has been on Monday.  Patient did call into her PCP who ordered a nasal CT scan which she is on to have completed today.    Past Medical History:   Diagnosis Date   • Arthritis    • Asthma    • Diverticulitis of colon    • High cholesterol    • Hypertension    • Type 2 diabetes mellitus (CMS/[facility])        Past Surgical History:   Procedure Laterality Date   • CARPAL TUNNEL RELEASE     • ROTATOR CUFF REPAIR         Allergies   Allergen Reactions   • Erythromycin Hives       Social History     Tobacco Use   Smoking Status Never Smoker   Smokeless Tobacco Never Used       Social History     Substance and Sexual Activity   Alcohol Use Yes       Social History     Substance and Sexual Activity   Drug Use No       No LMP recorded.    Review of Systems   Constitutional: Negative for fever.   HENT: Negative for nosebleeds.    Respiratory: Negative for cough and shortness of breath.    Cardiovascular: Negative for chest pain.   Gastrointestinal: Negative for nausea and vomiting.   Musculoskeletal: Negative for gait problem.   Skin: Negative for rash.   Neurological: Negative for dizziness and headaches.   Hematological: Does not bruise/bleed easily.   Psychiatric/Behavioral: Negative for confusion.       There were no vitals filed for this visit.    Physical Exam   Constitutional: She is oriented to person, place, and time. She appears well-developed and  well-nourished.   HENT:   Head: Normocephalic and atraumatic.   Mouth/Throat: Oropharynx is clear and moist.   Merisel in left nare.  Removed easily without complications.  No rebleed noted.  Nares lubricated with Vaseline.   Eyes: Conjunctivae are normal.   Cardiovascular: Normal rate.   Pulmonary/Chest: Effort normal.   Musculoskeletal: Normal range of motion.   Neurological: She is alert and oriented to person, place, and time.   Skin: Skin is warm and dry. Capillary refill takes less than 2 seconds.   Psychiatric: She has a normal mood and affect. Her behavior is normal. Judgment and thought content normal.   Nursing note and vitals reviewed.      Epistaxis Management  Date/Time: 5/1/2020 6:35 AM  Performed by: Taryn Osborne CRNP  Authorized by: Brent Littlejohn MD     Consent:     Consent obtained:  Verbal    Consent given by:  Patient    Risks discussed:  Bleeding and infection    Alternatives discussed:  Alternative treatment, observation and referral  Universal protocol:     Procedure explained and questions answered to patient or proxy's satisfaction: yes      Patient identity confirmed:  Verbally with patient and arm band  Anesthesia (see MAR for exact dosages):     Anesthesia method:  None  Procedure details:     Treatment site: L nare packign removal     Repair method: L nare Merocel removal.  Post-procedure details:     Post-procedure assessment: no re-bleed.    Patient tolerance of procedure:  Tolerated well, no immediate complications        ED Course as of May 01 0639   Fri May 01, 2020   0638 Impression: Nasal packing removal of left nare.    Plan: Removed successfully.  No rebleed.  Vaseline lubricated in left nare.  Supportive care reviewed.  Epistaxis precautions and treatments reviewed.  Strict return precautions reviewed.  Patient to follow-up with ENT on Monday and get her CT today.      [AO]      ED Course User Index  [AO] Taryn Osborne CRNP         Clinical Impressions as of May 01  0639   Encounter for removal of nasal packing       Final diagnoses:   None       Labs Reviewed - No data to display    No orders to display          Taryn Osborne CRNP  05/01/20 0659

## 2020-05-05 LAB — B BURGDOR AB SER IA-ACNC: 0.08 RATIO

## 2020-05-06 LAB — ANA SER QL IA: NEGATIVE

## 2020-07-07 ENCOUNTER — HOSPITAL ENCOUNTER (OUTPATIENT)
Dept: RADIOLOGY | Age: 65
Discharge: HOME | End: 2020-07-07
Attending: FAMILY MEDICINE
Payer: MEDICARE

## 2020-07-07 ENCOUNTER — TRANSCRIBE ORDERS (OUTPATIENT)
Dept: RADIOLOGY | Age: 65
End: 2020-07-07

## 2020-07-07 DIAGNOSIS — Z12.31 ENCOUNTER FOR SCREENING MAMMOGRAM FOR MALIGNANT NEOPLASM OF BREAST: Primary | ICD-10-CM

## 2020-07-07 DIAGNOSIS — Z12.31 ENCOUNTER FOR SCREENING MAMMOGRAM FOR MALIGNANT NEOPLASM OF BREAST: ICD-10-CM

## 2020-07-07 PROCEDURE — 77067 SCR MAMMO BI INCL CAD: CPT

## 2020-07-10 ENCOUNTER — APPOINTMENT (OUTPATIENT)
Dept: URBAN - METROPOLITAN AREA CLINIC 200 | Age: 65
Setting detail: DERMATOLOGY
End: 2020-07-19

## 2020-07-10 DIAGNOSIS — L57.8 OTHER SKIN CHANGES DUE TO CHRONIC EXPOSURE TO NONIONIZING RADIATION: ICD-10-CM

## 2020-07-10 DIAGNOSIS — L82.1 OTHER SEBORRHEIC KERATOSIS: ICD-10-CM

## 2020-07-10 PROCEDURE — OTHER COUNSELING: OTHER

## 2020-07-10 PROCEDURE — 99213 OFFICE O/P EST LOW 20 MIN: CPT

## 2020-07-10 PROCEDURE — OTHER REASSURANCE: OTHER

## 2020-07-10 ASSESSMENT — LOCATION SIMPLE DESCRIPTION DERM
LOCATION SIMPLE: CHEST
LOCATION SIMPLE: LEFT UPPER BACK

## 2020-07-10 ASSESSMENT — LOCATION DETAILED DESCRIPTION DERM
LOCATION DETAILED: LEFT SUPERIOR UPPER BACK
LOCATION DETAILED: LEFT MEDIAL SUPERIOR CHEST

## 2020-07-10 ASSESSMENT — LOCATION ZONE DERM: LOCATION ZONE: TRUNK

## 2020-08-07 NOTE — PROGRESS NOTES
Endocrinology  Note       PATIENTS: Edd Hammond  YOB: 1955  DATE: August 11, 2020  VISIT TYPE: follow up visit    History of Present Illness:   HPI 65 year old white female with type 2 diabetes since 2011.    She is currently on metformin 500 mg 1 tab daily.  Her fasting blood sugars are .  She reports no hypoglycemia.  She does have a past medical history of sleep apnea she cannot use her CPAP asthma and  hypertension.  She does not smoke she is a  she works from her home she is  with no children no pregnancies.  Her past surgical history includes bilateral carpal tunnel release left shoulder repair colonoscopy .  She has been working with pulmonary and states she has been diagnosed with a paralyzed diaphragm. She was last seen by ophthalmology in 2018 and podiatry most recently.  She has been intolerant to statins and Zetia and  Praluent . She will be having cervical neck surgery next month    Medical History:  Past Medical History:   Diagnosis Date   • Arthritis    • Asthma    • Diverticulitis of colon    • High cholesterol    • Hypertension    • Type 2 diabetes mellitus (CMS/[facility])        Surgical History:   Past Surgical History:   Procedure Laterality Date   • CARPAL TUNNEL RELEASE     • ROTATOR CUFF REPAIR         Family History:  Family History   Problem Relation Age of Onset   • COPD Biological Mother    • Lung cancer Biological Father        Social history:  Social History     Socioeconomic History   • Marital status:      Spouse name: Not on file   • Number of children: Not on file   • Years of education: Not on file   • Highest education level: Not on file   Occupational History   • Not on file   Social Needs   • Financial resource strain: Not on file   • Food insecurity:     Worry: Not on file     Inability: Not on file   • Transportation needs:     Medical: Not on file     Non-medical: Not on file   Tobacco Use   • Smoking status:  Never Smoker   • Smokeless tobacco: Never Used   Substance and Sexual Activity   • Alcohol use: Not Currently   • Drug use: No   • Sexual activity: Not on file   Lifestyle   • Physical activity:     Days per week: Not on file     Minutes per session: Not on file   • Stress: Not on file   Relationships   • Social connections:     Talks on phone: Not on file     Gets together: Not on file     Attends Rastafarian service: Not on file     Active member of club or organization: Not on file     Attends meetings of clubs or organizations: Not on file     Relationship status: Not on file   • Intimate partner violence:     Fear of current or ex partner: Not on file     Emotionally abused: Not on file     Physically abused: Not on file     Forced sexual activity: Not on file   Other Topics Concern   • Not on file   Social History Narrative   • Not on file       Medication(active prior to today):  Current Outpatient Medications   Medication Sig Dispense Refill   • albuterol HFA (VENTOLIN HFA) 90 mcg/actuation inhaler Inhale 2 puffs every 6 (six) hours as needed.     • alirocumab 75 mg/mL pen injector Inject 75 mg under the skin every 30 (thirty) days. 3 pen 3   • ascorbic acid (VITAMIN C) 500 mg tablet Take 500 mg by mouth daily.     • budesonide-formoterol (SYMBICORT) 80-4.5 mcg/actuation inhaler Inhale 2 puffs 2 (two) times a day. Rinse mouth with water after use to reduce aftertaste and incidence of candidiasis. Do not swallow.     • cannabidiol (CBD) oil 1 each See admin instr.     • cholecalciferol, vitamin D3, 5,000 unit tablet Take 5,000 Units by mouth daily.     • cyanocobalamin, vitamin B-12, 1,000 mcg capsule Take 1,000 mcg by mouth daily.     • fexofenadine (ALLEGRA) 180 mg tablet Take 180 mg by mouth daily.     • FISH OIL-omega-3-vit C-vit E (COROMEGA) 2,000-650-12 mg/2.5 gram emulsion in packet Take by mouth daily.     • FREESTYLE 28 gauge lancets Check blood sugar four times daily 400 each 3   • FREESTYLE LITE  "STRIPS strip 1 strip 4 (four) times a day. Dx code E 11.9 200 strip 11   • glucose 4 gram chewable tablet Take 16 g by mouth as needed for low blood sugar.     • lisinopril (PRINIVIL) 2.5 mg tablet Take 1 tablet (2.5 mg total) by mouth daily. 30 tablet 3   • metFORMIN (GLUCOPHAGE) 500 mg tablet Take 1 tablet (500 mg total) by mouth 2 (two) times a day with meals. 60 tablet 3   • multivitamin (THERAGRAN) tablet Take 1 tablet by mouth daily.     • riboflavin, vitamin B2, 100 mg tablet Take by mouth daily.       No current facility-administered medications for this visit.        Allergies:  Erythromycin    Review of Systems:   Constitutional: Negative for fatigue and unexpected weight change.   HENT: Negative for postnasal drip, trouble swallowing and voice change.    Eyes: Negative for visual disturbance.   Respiratory: Negative for apnea.    Cardiovascular: Negative for leg swelling.   Gastrointestinal: Negative for constipation, diarrhea and nausea.   Endocrine: Negative for polydipsia, polyphagia and polyuria.   Musculoskeletal: Negative for arthralgias, back pain, + neck pain and neck stiffness.   Neurological: Negative for numbness and headaches.   Psychiatric/Behavioral: Negative for sleep disturbance. + stress     Vitals Signs:  Vitals:    08/11/20 1108   BP: 140/70   BP Location: Right upper arm   Patient Position: Sitting   Pulse: 78   Weight: 65.8 kg (145 lb)   Height: 1.422 m (4' 8\")     Body mass index is 32.51 kg/m².      Physical Exam:  Constitutional: Patient is oriented to person, place, and time.  Appears well-developed and well-nourished. + overweight   Eyes: EOM are normal. Pupils are equal, round, and reactive to light.   Neck: Normal range of motion. Neck supple.   Cardiovascular: Normal rate, regular rhythm and normal heart sounds.    Pulmonary/Chest: Effort normal and breath sounds normal.   Abdominal: Soft.   Musculoskeletal: Normal range of motion.   Neurological:  Alert and oriented to " person, place, and time.   Skin: Skin is warm and dry.   Psychiatric:  Normal mood and affect. Behavior is normal. Judgment and thought content normal.   Foot Exam: Normal exam, no swelling, tenderness, instability; ligaments intact, full range of motion of all ankle/foot joints    Assessment/Plan:  Diabetes mellitus, stable (CMS/HCC) (HCC)  1. A1c 6.2  2. Continue with Metformin 500 mg 1 tab daily   3. Continue with diet and exercise  4. Call me for any low blood sugars   5. Call me for any steroids     Obesity due to excess calories without serious comorbidity  Goal for exercise 30 min 5 days per week     Other hyperlipidemia  Continue with low fat diet       Labs:  Lab Results   Component Value Date    WBC 9.75 05/01/2020    HGB 11.0 (L) 05/01/2020    HCT 34.3 (L) 05/01/2020     05/01/2020    CHOL 213 (H) 05/01/2020    TRIG 100 05/01/2020    HDL 54 (L) 05/01/2020    ALT 14 05/01/2020    AST 17 05/01/2020     05/01/2020    K 4.0 05/01/2020     05/01/2020    CREATININE 0.9 05/01/2020    BUN 9 05/01/2020    CO2 28 05/01/2020    TSH 1.52 05/01/2020    INR 1.1 05/01/2020    HGBA1C 6.2 (H) 05/01/2020    MICROALBUR 19.4 10/28/2019       Total encounter time 25 minutes greater than 50% spent counseling/coordination of care.  I reviewed the patient's blood sugars.  I discussed the goals of diet and exercise with the patient. I discussed the goals of A1c, blood pressure, and cholesterol. I discussed the goals of blood sugar pre- and post meals with the patient.  Her blood sugars have been stable and she may go forward with her upcoming surgery.  I reviewed the patient's labs/imaging/medications/allergies/problem list.       Electronically signed by: JUSTO Yarbrough on 8/11/2020 12:39 PM

## 2020-08-07 NOTE — ASSESSMENT & PLAN NOTE
1. A1c 6.2  2. Continue with Metformin 500 mg 1 tab daily   3. Continue with diet and exercise  4. Call me for any low blood sugars   5. Call me for any steroids

## 2020-08-11 ENCOUNTER — OFFICE VISIT (OUTPATIENT)
Dept: ENDOCRINOLOGY | Facility: CLINIC | Age: 65
End: 2020-08-11
Payer: MEDICARE

## 2020-08-11 VITALS
DIASTOLIC BLOOD PRESSURE: 70 MMHG | SYSTOLIC BLOOD PRESSURE: 140 MMHG | WEIGHT: 145 LBS | BODY MASS INDEX: 32.62 KG/M2 | HEART RATE: 78 BPM | HEIGHT: 56 IN

## 2020-08-11 DIAGNOSIS — E11.9 DIABETES MELLITUS, STABLE (CMS/HCC): Primary | ICD-10-CM

## 2020-08-11 DIAGNOSIS — E66.811 CLASS 1 OBESITY DUE TO EXCESS CALORIES WITHOUT SERIOUS COMORBIDITY IN ADULT, UNSPECIFIED BMI: ICD-10-CM

## 2020-08-11 DIAGNOSIS — E78.49 OTHER HYPERLIPIDEMIA: ICD-10-CM

## 2020-08-11 DIAGNOSIS — E66.09 CLASS 1 OBESITY DUE TO EXCESS CALORIES WITHOUT SERIOUS COMORBIDITY IN ADULT, UNSPECIFIED BMI: ICD-10-CM

## 2020-08-11 PROCEDURE — 99214 OFFICE O/P EST MOD 30 MIN: CPT | Performed by: NURSE PRACTITIONER

## 2020-08-11 RX ORDER — LANCETS 28 GAUGE
EACH MISCELLANEOUS
Qty: 400 EACH | Refills: 3 | Status: SHIPPED | OUTPATIENT
Start: 2020-08-11 | End: 2020-08-20 | Stop reason: SDUPTHER

## 2020-08-11 RX ORDER — BLOOD-GLUCOSE METER
1 KIT MISCELLANEOUS 4 TIMES DAILY
Qty: 200 STRIP | Refills: 11 | Status: SHIPPED | OUTPATIENT
Start: 2020-08-11 | End: 2020-08-20 | Stop reason: SDUPTHER

## 2020-08-11 RX ORDER — METFORMIN HYDROCHLORIDE 500 MG/1
500 TABLET ORAL 2 TIMES DAILY WITH MEALS
Qty: 60 TABLET | Refills: 3 | Status: SHIPPED | OUTPATIENT
Start: 2020-08-11 | End: 2020-09-10

## 2020-08-11 RX ORDER — MULTIVITAMIN
1 TABLET ORAL DAILY
COMMUNITY

## 2020-08-11 RX ORDER — ASCORBIC ACID 500 MG
500 TABLET ORAL DAILY
COMMUNITY

## 2020-08-11 NOTE — LETTER
August 11, 2020     ALVARO Harrington  [address]  [place] [address]    Patient: Edd Hammond  YOB: 1955  Date of Visit: 8/11/2020      Dear Dr. Byrd:    Thank you for referring Edd Hammond to me for evaluation. Below are my notes for this consultation.    If you have questions, please do not hesitate to call me. I look forward to following your patient along with you.         Sincerely,        JUSTO Yarbrough        CC: No Recipients  Yamilex Aguilar CRNP  8/11/2020 12:40 PM  Sign at close encounter                        Endocrinology  Note       PATIENTS: Edd Hammond  YOB: 1955  DATE: August 11, 2020  VISIT TYPE: follow up visit    History of Present Illness:   HPI 65 year old white female with type 2 diabetes since 2011.    She is currently on metformin 500 mg 1 tab daily.  Her fasting blood sugars are .  She reports no hypoglycemia.  She does have a past medical history of sleep apnea she cannot use her CPAP asthma and  hypertension.  She does not smoke she is a  she works from her home she is  with no children no pregnancies.  Her past surgical history includes bilateral carpal tunnel release left shoulder repair colonoscopy .  She has been working with pulmonary and states she has been diagnosed with a paralyzed diaphragm. She was last seen by ophthalmology in 2018 and podiatry most recently.  She has been intolerant to statins and Zetia and  Praluent  . She will be having cervical neck surgery next month    Medical History:  Past Medical History:   Diagnosis Date   • Arthritis    • Asthma    • Diverticulitis of colon    • High cholesterol    • Hypertension    • Type 2 diabetes mellitus (CMS/HCC)        Surgical History:   Past Surgical History:   Procedure Laterality Date   • CARPAL TUNNEL RELEASE     • ROTATOR CUFF REPAIR         Family History:  Family History   Problem Relation Age of Onset   • COPD Biological  Mother    • Lung cancer Biological Father        Social history:  Social History     Socioeconomic History   • Marital status:      Spouse name: Not on file   • Number of children: Not on file   • Years of education: Not on file   • Highest education level: Not on file   Occupational History   • Not on file   Social Needs   • Financial resource strain: Not on file   • Food insecurity:     Worry: Not on file     Inability: Not on file   • Transportation needs:     Medical: Not on file     Non-medical: Not on file   Tobacco Use   • Smoking status: Never Smoker   • Smokeless tobacco: Never Used   Substance and Sexual Activity   • Alcohol use: Not Currently   • Drug use: No   • Sexual activity: Not on file   Lifestyle   • Physical activity:     Days per week: Not on file     Minutes per session: Not on file   • Stress: Not on file   Relationships   • Social connections:     Talks on phone: Not on file     Gets together: Not on file     Attends Denominational service: Not on file     Active member of club or organization: Not on file     Attends meetings of clubs or organizations: Not on file     Relationship status: Not on file   • Intimate partner violence:     Fear of current or ex partner: Not on file     Emotionally abused: Not on file     Physically abused: Not on file     Forced sexual activity: Not on file   Other Topics Concern   • Not on file   Social History Narrative   • Not on file       Medication(active prior to today):  Current Outpatient Medications   Medication Sig Dispense Refill   • albuterol HFA (VENTOLIN HFA) 90 mcg/actuation inhaler Inhale 2 puffs every 6 (six) hours as needed.     • alirocumab 75 mg/mL pen injector Inject 75 mg under the skin every 30 (thirty) days. 3 pen 3   • ascorbic acid (VITAMIN C) 500 mg tablet Take 500 mg by mouth daily.     • budesonide-formoterol (SYMBICORT) 80-4.5 mcg/actuation inhaler Inhale 2 puffs 2 (two) times a day. Rinse mouth with water after use to reduce  aftertaste and incidence of candidiasis. Do not swallow.     • cannabidiol (CBD) oil 1 each See admin instr.     • cholecalciferol, vitamin D3, 5,000 unit tablet Take 5,000 Units by mouth daily.     • cyanocobalamin, vitamin B-12, 1,000 mcg capsule Take 1,000 mcg by mouth daily.     • fexofenadine (ALLEGRA) 180 mg tablet Take 180 mg by mouth daily.     • FISH OIL-omega-3-vit C-vit E (COROMEGA) 2,000-650-12 mg/2.5 gram emulsion in packet Take by mouth daily.     • FREESTYLE 28 gauge lancets Check blood sugar four times daily 400 each 3   • FREESTYLE LITE STRIPS strip 1 strip 4 (four) times a day. Dx code E 11.9 200 strip 11   • glucose 4 gram chewable tablet Take 16 g by mouth as needed for low blood sugar.     • lisinopril (PRINIVIL) 2.5 mg tablet Take 1 tablet (2.5 mg total) by mouth daily. 30 tablet 3   • metFORMIN (GLUCOPHAGE) 500 mg tablet Take 1 tablet (500 mg total) by mouth 2 (two) times a day with meals. 60 tablet 3   • multivitamin (THERAGRAN) tablet Take 1 tablet by mouth daily.     • riboflavin, vitamin B2, 100 mg tablet Take by mouth daily.       No current facility-administered medications for this visit.        Allergies:  Erythromycin    Review of Systems:   Constitutional: Negative for fatigue and unexpected weight change.   HENT: Negative for postnasal drip, trouble swallowing and voice change.    Eyes: Negative for visual disturbance.   Respiratory: Negative for apnea.    Cardiovascular: Negative for leg swelling.   Gastrointestinal: Negative for constipation, diarrhea and nausea.   Endocrine: Negative for polydipsia, polyphagia and polyuria.   Musculoskeletal: Negative for arthralgias, back pain, + neck pain and neck stiffness.   Neurological: Negative for numbness and headaches.   Psychiatric/Behavioral: Negative for sleep disturbance. + stress     Vitals Signs:  Vitals:    08/11/20 1108   BP: 140/70   BP Location: Right upper arm   Patient Position: Sitting   Pulse: 78   Weight: 65.8 kg (145 lb)  "  Height: 1.422 m (4' 8\")     Body mass index is 32.51 kg/m².      Physical Exam:  Constitutional: Patient is oriented to person, place, and time.  Appears well-developed and well-nourished. + overweight   Eyes: EOM are normal. Pupils are equal, round, and reactive to light.   Neck: Normal range of motion. Neck supple.   Cardiovascular: Normal rate, regular rhythm and normal heart sounds.    Pulmonary/Chest: Effort normal and breath sounds normal.   Abdominal: Soft.   Musculoskeletal: Normal range of motion.   Neurological:  Alert and oriented to person, place, and time.   Skin: Skin is warm and dry.   Psychiatric:  Normal mood and affect. Behavior is normal. Judgment and thought content normal.   Foot Exam: Normal exam, no swelling, tenderness, instability; ligaments intact, full range of motion of all ankle/foot joints    Assessment/Plan:  Diabetes mellitus, stable (CMS/[facility]) ([facility])  1. A1c 6.2  2. Continue with Metformin 500 mg 1 tab daily   3. Continue with diet and exercise  4. Call me for any low blood sugars   5. Call me for any steroids     Obesity due to excess calories without serious comorbidity  Goal for exercise 30 min 5 days per week     Other hyperlipidemia  Continue with low fat diet       Labs:  Lab Results   Component Value Date    WBC 9.75 05/01/2020    HGB 11.0 (L) 05/01/2020    HCT 34.3 (L) 05/01/2020     05/01/2020    CHOL 213 (H) 05/01/2020    TRIG 100 05/01/2020    HDL 54 (L) 05/01/2020    ALT 14 05/01/2020    AST 17 05/01/2020     05/01/2020    K 4.0 05/01/2020     05/01/2020    CREATININE 0.9 05/01/2020    BUN 9 05/01/2020    CO2 28 05/01/2020    TSH 1.52 05/01/2020    INR 1.1 05/01/2020    HGBA1C 6.2 (H) 05/01/2020    MICROALBUR 19.4 10/28/2019       Total encounter time 25 minutes greater than 50% spent counseling/coordination of care.  I reviewed the patient's blood sugars.  I discussed the goals of diet and exercise with the patient. I discussed the goals of A1c, blood " pressure, and cholesterol. I discussed the goals of blood sugar pre- and post meals with the patient.  Her blood sugars have been stable and she may go forward with her upcoming surgery.  I reviewed the patient's labs/imaging/medications/allergies/problem list.       Electronically signed by: JUSTO Yarbrough on 8/11/2020 12:39 PM

## 2020-08-11 NOTE — PATIENT INSTRUCTIONS
Diabetes mellitus, stable (CMS/[facility]) ([facility])  1. A1c 6.2  2. Continue with Metformin 500 mg 1 tab daily   3. Continue with diet and exercise  4. Call me for any low blood sugars   5. Call me for any steroids     Obesity due to excess calories without serious comorbidity  Goal for exercise 30 min 5 days per week     Other hyperlipidemia  Continue with low fat diet     Patient Education     Diabetes Mellitus and Exercise  Exercising regularly is important for your overall health, especially when you have diabetes (diabetes mellitus). Exercising is not only about losing weight. It has many other health benefits, such as increasing muscle strength and bone density and reducing body fat and stress. This leads to improved fitness, flexibility, and endurance, all of which result in better overall health.  Exercise has additional benefits for people with diabetes, including:  · Reducing appetite.  · Helping to lower and control blood glucose.  · Lowering blood pressure.  · Helping to control amounts of fatty substances (lipids) in the blood, such as cholesterol and triglycerides.  · Helping the body to respond better to insulin (improving insulin sensitivity).  · Reducing how much insulin the body needs.  · Decreasing the risk for heart disease by:  ? Lowering cholesterol and triglyceride levels.  ? Increasing the levels of good cholesterol.  ? Lowering blood glucose levels.  What is my activity plan?  Your health care provider or certified diabetes educator can help you make a plan for the type and frequency of exercise (activity plan) that works for you. Make sure that you:  · Do at least 150 minutes of moderate-intensity or vigorous-intensity exercise each week. This could be brisk walking, biking, or water aerobics.  ? Do stretching and strength exercises, such as yoga or weightlifting, at least 2 times a week.  ? Spread out your activity over at least 3 days of the week.  · Get some form of physical activity every  day.  ? Do not go more than 2 days in a row without some kind of physical activity.  ? Avoid being inactive for more than 30 minutes at a time. Take frequent breaks to walk or stretch.  · Choose a type of exercise or activity that you enjoy, and set realistic goals.  · Start slowly, and gradually increase the intensity of your exercise over time.  What do I need to know about managing my diabetes?    · Check your blood glucose before and after exercising.  ? If your blood glucose is 240 mg/dL (13.3 mmol/L) or higher before you exercise, check your urine for ketones. If you have ketones in your urine, do not exercise until your blood glucose returns to normal.  ? If your blood glucose is 100 mg/dL (5.6 mmol/L) or lower, eat a snack containing 15-20 grams of carbohydrate. Check your blood glucose 15 minutes after the snack to make sure that your level is above 100 mg/dL (5.6 mmol/L) before you start your exercise.  · Know the symptoms of low blood glucose (hypoglycemia) and how to treat it. Your risk for hypoglycemia increases during and after exercise. Common symptoms of hypoglycemia can include:  ? Hunger.  ? Anxiety.  ? Sweating and feeling clammy.  ? Confusion.  ? Dizziness or feeling light-headed.  ? Increased heart rate or palpitations.  ? Blurry vision.  ? Tingling or numbness around the mouth, lips, or tongue.  ? Tremors or shakes.  ? Irritability.  · Keep a rapid-acting carbohydrate snack available before, during, and after exercise to help prevent or treat hypoglycemia.  · Avoid injecting insulin into areas of the body that are going to be exercised. For example, avoid injecting insulin into:  ? The arms, when playing tennis.  ? The legs, when jogging.  · Keep records of your exercise habits. Doing this can help you and your health care provider adjust your diabetes management plan as needed. Write down:  ? Food that you eat before and after you exercise.  ? Blood glucose levels before and after you  exercise.  ? The type and amount of exercise you have done.  ? When your insulin is expected to peak, if you use insulin. Avoid exercising at times when your insulin is peaking.  · When you start a new exercise or activity, work with your health care provider to make sure the activity is safe for you, and to adjust your insulin, medicines, or food intake as needed.  · Drink plenty of water while you exercise to prevent dehydration or heat stroke. Drink enough fluid to keep your urine clear or pale yellow.  Summary  · Exercising regularly is important for your overall health, especially when you have diabetes (diabetes mellitus).  · Exercising has many health benefits, such as increasing muscle strength and bone density and reducing body fat and stress.  · Your health care provider or certified diabetes educator can help you make a plan for the type and frequency of exercise (activity plan) that works for you.  · When you start a new exercise or activity, work with your health care provider to make sure the activity is safe for you, and to adjust your insulin, medicines, or food intake as needed.  This information is not intended to replace advice given to you by your health care provider. Make sure you discuss any questions you have with your health care provider.  Document Released: 03/09/2005 Document Revised: 06/28/2018 Document Reviewed: 05/29/2017  [employer] Patient Education © 2019 [employer]

## 2020-08-14 LAB
ALBUMIN SERPL-MCNC: 4.4 G/DL (ref 3.8–4.8)
ALBUMIN/CREAT UR: 11 MG/G CREAT (ref 0–29)
ALBUMIN/GLOB SERPL: 1.7 {RATIO} (ref 1.2–2.2)
ALP SERPL-CCNC: 80 IU/L (ref 39–117)
ALT SERPL-CCNC: 17 IU/L (ref 0–32)
AST SERPL-CCNC: 16 IU/L (ref 0–40)
BILIRUB SERPL-MCNC: 0.3 MG/DL (ref 0–1.2)
BUN SERPL-MCNC: 15 MG/DL (ref 8–27)
BUN/CREAT SERPL: 17 (ref 12–28)
CALCIUM SERPL-MCNC: 9.3 MG/DL (ref 8.7–10.3)
CHLORIDE SERPL-SCNC: 98 MMOL/L (ref 96–106)
CHOLEST SERPL-MCNC: 256 MG/DL (ref 100–199)
CO2 SERPL-SCNC: 30 MMOL/L (ref 20–29)
CREAT SERPL-MCNC: 0.88 MG/DL (ref 0.57–1)
CREAT UR-MCNC: 119.5 MG/DL
GLOBULIN SER CALC-MCNC: 2.6 G/DL (ref 1.5–4.5)
GLUCOSE SERPL-MCNC: 110 MG/DL (ref 65–99)
HBA1C MFR BLD: 6.5 % (ref 4.8–5.6)
HDLC SERPL-MCNC: 56 MG/DL
LAB CORP EGFR IF AFRICN AM: 80 ML/MIN/1.73
LAB CORP EGFR IF NONAFRICN AM: 69 ML/MIN/1.73
LDLC SERPL CALC-MCNC: 156 MG/DL (ref 0–99)
MICROALBUMIN UR-MCNC: 12.9 UG/ML
POTASSIUM SERPL-SCNC: 4.8 MMOL/L (ref 3.5–5.2)
PROT SERPL-MCNC: 7 G/DL (ref 6–8.5)
SODIUM SERPL-SCNC: 140 MMOL/L (ref 134–144)
T4 FREE SERPL-MCNC: 1.33 NG/DL (ref 0.82–1.77)
TRIGL SERPL-MCNC: 219 MG/DL (ref 0–149)
TSH SERPL DL<=0.005 MIU/L-ACNC: 2.11 UIU/ML (ref 0.45–4.5)
VLDLC SERPL CALC-MCNC: 44 MG/DL (ref 5–40)

## 2020-08-17 ENCOUNTER — TELEPHONE (OUTPATIENT)
Dept: ENDOCRINOLOGY | Facility: CLINIC | Age: 65
End: 2020-08-17

## 2020-08-17 NOTE — TELEPHONE ENCOUNTER
Pt called to confirm letter needed for clearance for surgery.  Office is requesting a letter head letter giving clearance to have clearance on her neck.  Must be sent to  [phone] (fax)

## 2020-08-20 ENCOUNTER — TELEPHONE (OUTPATIENT)
Dept: ENDOCRINOLOGY | Facility: CLINIC | Age: 65
End: 2020-08-20

## 2020-08-20 DIAGNOSIS — E11.9 DIABETES MELLITUS, STABLE (CMS/HCC): Primary | ICD-10-CM

## 2020-08-20 RX ORDER — LANCETS 28 GAUGE
EACH MISCELLANEOUS
Qty: 100 EACH | Refills: 3 | Status: SHIPPED | OUTPATIENT
Start: 2020-08-20

## 2020-08-20 RX ORDER — BLOOD-GLUCOSE METER
KIT MISCELLANEOUS
Qty: 100 STRIP | Refills: 3 | Status: SHIPPED | OUTPATIENT
Start: 2020-08-20

## 2020-09-24 ENCOUNTER — TELEPHONE (OUTPATIENT)
Dept: ENDOCRINOLOGY | Facility: CLINIC | Age: 65
End: 2020-09-24

## 2021-04-12 ENCOUNTER — TRANSCRIBE ORDERS (OUTPATIENT)
Dept: SCHEDULING | Age: 66
End: 2021-04-12

## 2021-04-12 DIAGNOSIS — K59.09 OTHER CONSTIPATION: Primary | ICD-10-CM

## 2021-04-12 DIAGNOSIS — Z12.31 ENCOUNTER FOR SCREENING MAMMOGRAM FOR MALIGNANT NEOPLASM OF BREAST: ICD-10-CM

## 2021-04-12 DIAGNOSIS — Z12.39 ENCOUNTER FOR OTHER SCREENING FOR MALIGNANT NEOPLASM OF BREAST: Primary | ICD-10-CM

## 2021-05-10 ENCOUNTER — HOSPITAL ENCOUNTER (OUTPATIENT)
Dept: RADIOLOGY | Age: 66
Discharge: HOME | End: 2021-05-10
Attending: INTERNAL MEDICINE
Payer: MEDICARE

## 2021-05-10 DIAGNOSIS — K59.09 OTHER CONSTIPATION: ICD-10-CM

## 2021-05-10 PROCEDURE — 76700 US EXAM ABDOM COMPLETE: CPT

## 2021-05-28 ENCOUNTER — APPOINTMENT (OUTPATIENT)
Dept: URBAN - METROPOLITAN AREA CLINIC 200 | Age: 66
Setting detail: DERMATOLOGY
End: 2021-05-30

## 2021-05-28 DIAGNOSIS — L44.8 OTHER SPECIFIED PAPULOSQUAMOUS DISORDERS: ICD-10-CM

## 2021-05-28 DIAGNOSIS — L57.8 OTHER SKIN CHANGES DUE TO CHRONIC EXPOSURE TO NONIONIZING RADIATION: ICD-10-CM

## 2021-05-28 DIAGNOSIS — L82.1 OTHER SEBORRHEIC KERATOSIS: ICD-10-CM

## 2021-05-28 PROBLEM — D23.5 OTHER BENIGN NEOPLASM OF SKIN OF TRUNK: Status: ACTIVE | Noted: 2021-05-28

## 2021-05-28 PROBLEM — I10 ESSENTIAL (PRIMARY) HYPERTENSION: Status: ACTIVE | Noted: 2021-05-28

## 2021-05-28 PROBLEM — J30.1 ALLERGIC RHINITIS DUE TO POLLEN: Status: ACTIVE | Noted: 2021-05-28

## 2021-05-28 PROCEDURE — 99213 OFFICE O/P EST LOW 20 MIN: CPT

## 2021-05-28 PROCEDURE — OTHER COUNSELING: OTHER

## 2021-05-28 PROCEDURE — OTHER REASSURANCE: OTHER

## 2021-05-28 ASSESSMENT — LOCATION DETAILED DESCRIPTION DERM
LOCATION DETAILED: RIGHT INFERIOR PREAURICULAR CHEEK
LOCATION DETAILED: LEFT SUPERIOR UPPER BACK
LOCATION DETAILED: LEFT MEDIAL SUPERIOR CHEST

## 2021-05-28 ASSESSMENT — LOCATION SIMPLE DESCRIPTION DERM
LOCATION SIMPLE: RIGHT CHEEK
LOCATION SIMPLE: CHEST
LOCATION SIMPLE: LEFT UPPER BACK

## 2021-05-28 ASSESSMENT — LOCATION ZONE DERM
LOCATION ZONE: TRUNK
LOCATION ZONE: FACE

## 2021-05-28 NOTE — PROCEDURE: REASSURANCE
Hide Additional Notes?: No
Include Location In Plan?: Yes
Additional Note: Cryo
Detail Level: Detailed

## 2021-07-09 ENCOUNTER — TRANSCRIBE ORDERS (OUTPATIENT)
Dept: RADIOLOGY | Age: 66
End: 2021-07-09

## 2021-07-09 ENCOUNTER — HOSPITAL ENCOUNTER (OUTPATIENT)
Dept: RADIOLOGY | Age: 66
Discharge: HOME | End: 2021-07-09
Attending: NURSE PRACTITIONER
Payer: MEDICARE

## 2021-07-09 ENCOUNTER — HOSPITAL ENCOUNTER (OUTPATIENT)
Dept: RADIOLOGY | Age: 66
Discharge: HOME | End: 2021-07-09
Attending: CHIROPRACTOR
Payer: MEDICARE

## 2021-07-09 DIAGNOSIS — Z12.31 ENCOUNTER FOR SCREENING MAMMOGRAM FOR MALIGNANT NEOPLASM OF BREAST: ICD-10-CM

## 2021-07-09 DIAGNOSIS — M62.830 MUSCLE SPASM OF BACK: ICD-10-CM

## 2021-07-09 DIAGNOSIS — M99.01 SEGMENTAL AND SOMATIC DYSFUNCTION OF CERVICAL REGION: ICD-10-CM

## 2021-07-09 DIAGNOSIS — M99.03 SEGMENTAL AND SOMATIC DYSFUNCTION OF LUMBAR REGION: ICD-10-CM

## 2021-07-09 DIAGNOSIS — M99.04 SEGMENTAL AND SOMATIC DYSFUNCTION OF SACRAL REGION: ICD-10-CM

## 2021-07-09 DIAGNOSIS — M62.830 MUSCLE SPASM OF BACK: Primary | ICD-10-CM

## 2021-07-09 PROCEDURE — 72110 X-RAY EXAM L-2 SPINE 4/>VWS: CPT

## 2021-07-09 PROCEDURE — 77063 BREAST TOMOSYNTHESIS BI: CPT

## 2021-07-09 PROCEDURE — 72050 X-RAY EXAM NECK SPINE 4/5VWS: CPT

## 2022-05-31 ENCOUNTER — APPOINTMENT (OUTPATIENT)
Dept: URBAN - METROPOLITAN AREA CLINIC 200 | Age: 67
Setting detail: DERMATOLOGY
End: 2022-05-31

## 2022-05-31 DIAGNOSIS — L81.4 OTHER MELANIN HYPERPIGMENTATION: ICD-10-CM

## 2022-05-31 DIAGNOSIS — L57.8 OTHER SKIN CHANGES DUE TO CHRONIC EXPOSURE TO NONIONIZING RADIATION: ICD-10-CM

## 2022-05-31 DIAGNOSIS — Z11.52 ENCOUNTER FOR SCREENING FOR COVID-19: ICD-10-CM

## 2022-05-31 PROBLEM — J30.1 ALLERGIC RHINITIS DUE TO POLLEN: Status: ACTIVE | Noted: 2022-05-31

## 2022-05-31 PROCEDURE — OTHER PRESCRIPTION MEDICATION MANAGEMENT: OTHER

## 2022-05-31 PROCEDURE — OTHER SUNSCREEN RECOMMENDATIONS: OTHER

## 2022-05-31 PROCEDURE — 99213 OFFICE O/P EST LOW 20 MIN: CPT

## 2022-05-31 PROCEDURE — OTHER PRESCRIPTION: OTHER

## 2022-05-31 PROCEDURE — OTHER SCREENING FOR COVID-19: OTHER

## 2022-05-31 PROCEDURE — OTHER COUNSELING: OTHER

## 2022-05-31 RX ORDER — HYDROQUINONE 4 %
CREAM (GRAM) TOPICAL
Qty: 3 | Refills: 3 | Status: CANCELLED
Stop reason: CLARIF

## 2022-05-31 ASSESSMENT — LOCATION ZONE DERM
LOCATION ZONE: FACE
LOCATION ZONE: TRUNK

## 2022-05-31 ASSESSMENT — LOCATION DETAILED DESCRIPTION DERM
LOCATION DETAILED: RIGHT SUPERIOR MEDIAL FOREHEAD
LOCATION DETAILED: PERIUMBILICAL SKIN

## 2022-05-31 ASSESSMENT — LOCATION SIMPLE DESCRIPTION DERM
LOCATION SIMPLE: ABDOMEN
LOCATION SIMPLE: RIGHT FOREHEAD

## 2022-06-14 ENCOUNTER — TRANSCRIBE ORDERS (OUTPATIENT)
Dept: SCHEDULING | Age: 67
End: 2022-06-14

## 2022-06-14 DIAGNOSIS — Z12.31 ENCOUNTER FOR SCREENING MAMMOGRAM FOR MALIGNANT NEOPLASM OF BREAST: Primary | ICD-10-CM

## 2022-07-18 ENCOUNTER — HOSPITAL ENCOUNTER (OUTPATIENT)
Dept: RADIOLOGY | Age: 67
Discharge: HOME | End: 2022-07-18
Attending: FAMILY MEDICINE
Payer: MEDICARE

## 2022-07-18 DIAGNOSIS — Z12.31 ENCOUNTER FOR SCREENING MAMMOGRAM FOR MALIGNANT NEOPLASM OF BREAST: ICD-10-CM

## 2022-07-18 PROCEDURE — 77067 SCR MAMMO BI INCL CAD: CPT

## 2022-08-15 ENCOUNTER — HOSPITAL ENCOUNTER (EMERGENCY)
Facility: HOSPITAL | Age: 67
Discharge: HOME | End: 2022-08-15
Attending: EMERGENCY MEDICINE
Payer: MEDICARE

## 2022-08-15 VITALS
OXYGEN SATURATION: 95 % | RESPIRATION RATE: 14 BRPM | WEIGHT: 147 LBS | TEMPERATURE: 98.4 F | HEART RATE: 72 BPM | DIASTOLIC BLOOD PRESSURE: 89 MMHG | HEIGHT: 55 IN | SYSTOLIC BLOOD PRESSURE: 131 MMHG | BODY MASS INDEX: 34.02 KG/M2

## 2022-08-15 DIAGNOSIS — L03.011 PARONYCHIA OF RIGHT MIDDLE FINGER: Primary | ICD-10-CM

## 2022-08-15 PROCEDURE — 26010 DRAINAGE OF FINGER ABSCESS: CPT | Performed by: PHYSICIAN ASSISTANT

## 2022-08-15 PROCEDURE — 99282 EMERGENCY DEPT VISIT SF MDM: CPT | Mod: 25

## 2022-08-15 PROCEDURE — 0H9FXZZ DRAINAGE OF RIGHT HAND SKIN, EXTERNAL APPROACH: ICD-10-PCS | Performed by: EMERGENCY MEDICINE

## 2022-08-15 RX ORDER — CEPHALEXIN 500 MG/1
500 CAPSULE ORAL 4 TIMES DAILY
Qty: 28 CAPSULE | Refills: 0 | Status: SHIPPED | OUTPATIENT
Start: 2022-08-15 | End: 2022-08-22

## 2022-08-15 NOTE — ED ATTESTATION NOTE
Procedures  Physical Exam  Review of Systems    8/15/96589:30 AM  I have personally seen and examined the patient.  I reviewed and agree with the PA/NP/Resident's assessment and plan of care.    My examination, assessment, and plan of care of Edd Hammond is as follows:  The patient presents with 3 days of right long finger pain along the nailbed  Exam: There is evidence of redness purulence well adjacent to the nailbed characteristic of a paronychia will be drained  Impression/Plan: Paronychia I right long finger    Vital Signs Review: Vital signs have been reviewed. The oxygen saturation is  SpO2: 95 % which is normal.    I was physically present for the key/critical portions of the following procedures: None    This document was created using dragon dictation software.  There might be some typographical errors due to this technology.    We are in a pandemic with increased volumes and decreased capacity.      Brent Braden,   08/15/22 0331

## 2022-08-15 NOTE — ED PROVIDER NOTES
"67-year-old female presenting to the ED for right index finger infection.  Patient was seen at urgent care 4 days ago and started on Bactrim.  Patient reports that today finger is more swollen and there is an area of infection.  No fevers.  Patient is diabetic       History provided by:  Patient and medical records   used: No        Past Medical History:   Diagnosis Date   • Arthritis    • Asthma    • Diverticulitis of colon    • High cholesterol    • Hypertension    • Type 2 diabetes mellitus (CMS/HCC)        Past Surgical History:   Procedure Laterality Date   • CARPAL TUNNEL RELEASE     • ROTATOR CUFF REPAIR         Allergies   Allergen Reactions   • Erythromycin Hives       Social History     Tobacco Use   Smoking Status Never Smoker   Smokeless Tobacco Never Used       Social History     Substance and Sexual Activity   Alcohol Use Not Currently       Social History     Substance and Sexual Activity   Drug Use No       No LMP recorded.    Review of Systems   Constitutional: Negative for fever.   Musculoskeletal: Positive for arthralgias.   Skin: Positive for color change.       Vitals:    08/15/22 0238   BP: 131/89   BP Location: Right upper arm   Patient Position: Sitting   Pulse: 72   Resp: 14   Temp: 36.9 °C (98.4 °F)   TempSrc: Temporal   SpO2: 95%   Weight: 66.7 kg (147 lb)   Height: 1.397 m (4' 7\")       Physical Exam  Vitals and nursing note reviewed.   Musculoskeletal:      Right hand: Swelling and tenderness present. No bony tenderness. Normal range of motion.        Hands:    Skin:     Capillary Refill: Capillary refill takes 2 to 3 seconds.      Findings: Erythema present.   Neurological:      Mental Status: She is alert.         Incision and Drainage    Date/Time: 8/22/2022 3:45 AM  Performed by: Reyna Ferguson PA C  Authorized by: Brent Braden DO     Universal protocol:     Patient identity confirmed:  Verbally with patient  Location:     Type:  Abscess    Size:  0.5    " Location:  Upper extremity    Upper extremity location:  Finger    Finger location:  R index finger  Pre-procedure details:     Skin preparation:  Antiseptic wash  Sedation:     Sedation type:  None  Anesthesia:     Anesthesia method:  None  Procedure details:     Incision types:  Stab incision    Incision depth:  Dermal    Drainage:  Purulent    Drainage amount:  Scant    Wound treatment:  Wound left open  Post-procedure details:     Procedure completion:  Tolerated        ED Course as of 08/22/22 0346   Mon Aug 22, 2022   0345 Impression: Paronychia right index finger  Plan: Incision and drainage.  will add Keflex in addition to Bactrim.   [KL]      ED Course User Index  [KL] Reyna Ferguson PA C       Final diagnoses:   [L03.011] Paronychia of right middle finger       Labs Reviewed - No data to display    No orders to display          Reyna Ferguson PA C  08/22/22 0346

## 2022-08-15 NOTE — DISCHARGE INSTRUCTIONS
You really evaluated for a finger tip infection.  Please continue warm soaks.  Please keep covered.  Please apply antibiotic ointment.  Please continue with Bactrim and also start taking Keflex as prescribed.  Please follow-up with your primary care provider.  Please return to the ED for new or concerning symptoms

## 2022-08-21 NOTE — ED PROVIDER NOTES
HPI     Chief Complaint   Patient presents with   • Epistaxis (Nose Bleed)       63 y/o female presents to ED c/o epistaxis over the last week. Pt has had at least 5 nosebleeds, primarily from the left nare, but sometimes bleeding from the right as well as the left lacrimal duct. She states her nose has been dry and this started with rubbing her nose due to allergies. She was here in the ED 5 days ago, had TXA/cocaine applied and pt was discharged. She has a f/u appt with ENT tomorrow. Pt had another episode of epistaxis this morning for the last few hours and called 911. She currently reports headache, denies any lightheadedness, shortness of breath or other complaints at this time.            Patient History     Past Medical History:   Diagnosis Date   • Arthritis    • Asthma    • Diverticulitis of colon    • High cholesterol    • Hypertension    • Type 2 diabetes mellitus (CMS/HCC)        Past Surgical History:   Procedure Laterality Date   • CARPAL TUNNEL RELEASE     • ROTATOR CUFF REPAIR         Family History   Problem Relation Age of Onset   • COPD Biological Mother    • Lung cancer Biological Father        Social History     Tobacco Use   • Smoking status: Never Smoker   • Smokeless tobacco: Never Used   Substance Use Topics   • Alcohol use: Yes   • Drug use: No       Systems Reviewed from Nursing Triage:          Review of Systems     Review of Systems   Constitutional: Positive for fatigue. Negative for chills and fever.   HENT: Positive for congestion, nosebleeds and sinus pain.    Eyes: Negative for visual disturbance.   Respiratory: Negative for cough, chest tightness and shortness of breath.    Cardiovascular: Negative for chest pain, palpitations and leg swelling.   Gastrointestinal: Negative for abdominal pain, constipation, diarrhea, nausea and vomiting.   Musculoskeletal: Negative for arthralgias and back pain.   Skin: Negative for color change and rash.   Neurological: Positive for headaches.  Negative for dizziness, syncope, weakness and numbness.        Physical Exam     ED Triage Vitals   Temp Heart Rate Resp BP SpO2   04/28/20 1003 04/28/20 1003 04/28/20 1003 04/28/20 1003 04/28/20 1003   37 °C (98.6 °F) 90 18 (!) 146/74 96 %      Temp Source Heart Rate Source Patient Position BP Location FiO2 (%) (Set)   04/28/20 1003 04/28/20 1134 -- -- --   Oral Monitor                        Patient Vitals for the past 24 hrs:   BP Temp Temp src Pulse Resp SpO2   04/28/20 1306 140/90 -- -- 82 18 98 %   04/28/20 1134 137/72 -- -- 88 18 97 %   04/28/20 1003 (!) 146/74 37 °C (98.6 °F) Oral 90 18 96 %                                          Physical Exam   Constitutional: She appears well-developed and well-nourished. No distress.   HENT:   Head: Normocephalic and atraumatic.   Active bleeding from the left nare and in the oropharynx  Blood clots in the right nare   Eyes: Conjunctivae are normal.   Cardiovascular: Normal rate and regular rhythm.   No murmur heard.  Pulmonary/Chest: Effort normal and breath sounds normal. No respiratory distress.   Abdominal: Soft. There is no tenderness.   Musculoskeletal: She exhibits no edema.   Neurological: She is alert.   Skin: Skin is warm and dry. No rash noted.   Psychiatric: She has a normal mood and affect.   Nursing note and vitals reviewed.           Procedures    ED Course & MDM     Labs Reviewed   BASIC METABOLIC PANEL - Abnormal       Result Value    Sodium 137      Potassium 4.1      Chloride 101      CO2 25      BUN 17      Creatinine 0.8      Glucose 134 (*)     Calcium 8.9      eGFR >60.0      Anion Gap 11     CBC AND DIFF    WBC 7.64      RBC 4.27      Hemoglobin 12.5      Hematocrit 38.2      MCV 89.5      MCH 29.3      MCHC 32.7      RDW 13.1      Platelets 224      MPV 9.6      Differential Type Auto      nRBC 0.0      Immature Granulocytes 0.4      Neutrophils 75.1      Lymphocytes 17.4      Monocytes 5.1      Eosinophils 1.3      Basophils 0.7      Immature  Granulocytes, Absolute 0.03      Neutrophils, Absolute 5.74      Lymphocytes, Absolute 1.33      Monocytes, Absolute 0.39      Eosinophils, Absolute 0.10      Basophils, Absolute 0.05     RAINBOW DRAW PANEL    Narrative:     The following orders were created for panel order [place] Draw Panel.  Procedure                               Abnormality         Status                     ---------                               -----------         ------                     RAINBOW RED[[id number]]                                      In process                 RAINBOW LT BLUE[[id number]]                                  In process                 RAINBOW LT GREEN[[id number]]                                 In process                 RAINBOW GOLD[[id number]]                                     In process                   Please view results for these tests on the individual orders.   RAINBOW RED   RAINBOW LT BLUE   RAINBOW LT GREEN   RAINBOW GOLD       No orders to display               MDM         ED Course as of Apr 28 1703   Tue Apr 28, 2020   1019 Pt likely will need packing however she is refusing a Rapid Rhino at this time  Will try cocaine and TXA first, potentially merocel packing which will be more comfortable  Labs ordered  Discussed with and evaluated by Dr. Hernandez     [JL]   1108 Cocaine applied x 10 minutes, bleeding very minimal, able to use silver nitrate cautery to left nare about FCI back on the septum  TXA then applied to help prevent rebleeding  Gave pt the option to use a packing anyway to prevent rebleeding on discharge    [JL]   1129 High- this is fasting   Glucose(!): 134 [JL]   1130 Stable compared w/ 8 months ago   Hemoglobin: 12.5 [JL]   1147 Pt was very hesitant for me to use a Rapid Rhino, anxious that she would not be able to breathe or that we would cause damage to the nose as she knows someone who got a deviated septum from a packing  I was able to place a Merocel packing (trimmed a cm for  pt comfort) and filled w/ TXA and saline. Pt still having slight bleeding /spitting up blood and clots from in oropharynx. Will observe    [JL]   1237 Pt has some clotting now in the R nare and feels congested but she is not having active bleeding, juice/crackers given, will plan to discharge, pt also to f/u with her ENT at [facility] tomorrow as scheduled    [JL]   1310 No further bleeding  Pt feeling better and agreeable to discharge  Will return in 3 days for packing removal since ENT is not doing packing removal due to COVID 19 pandemic. Pt has an appt w/ her ENT at [facility] tomorrow    [JL]      ED Course User Index  [JL] Kristina King PA C         Clinical Impressions as of Apr 28 1703   Epistaxis        Kristina King PA C  04/28/20 1703     Male

## 2022-08-22 PROCEDURE — 26010 DRAINAGE OF FINGER ABSCESS: CPT | Performed by: PHYSICIAN ASSISTANT

## 2022-08-22 ASSESSMENT — ENCOUNTER SYMPTOMS
ARTHRALGIAS: 1
FEVER: 0
COLOR CHANGE: 1

## 2022-09-23 ENCOUNTER — TRANSCRIBE ORDERS (OUTPATIENT)
Dept: SCHEDULING | Age: 67
End: 2022-09-23

## 2022-09-23 DIAGNOSIS — R91.1 SOLITARY PULMONARY NODULE: Primary | ICD-10-CM

## 2022-10-11 ENCOUNTER — HOSPITAL ENCOUNTER (OUTPATIENT)
Dept: RADIOLOGY | Age: 67
Discharge: HOME | End: 2022-10-11
Attending: INTERNAL MEDICINE
Payer: MEDICARE

## 2022-10-11 DIAGNOSIS — R91.1 SOLITARY PULMONARY NODULE: ICD-10-CM

## 2022-10-11 PROCEDURE — 71250 CT THORAX DX C-: CPT

## 2023-06-21 ENCOUNTER — APPOINTMENT (OUTPATIENT)
Dept: URBAN - METROPOLITAN AREA CLINIC 203 | Age: 68
Setting detail: DERMATOLOGY
End: 2023-06-29

## 2023-06-21 DIAGNOSIS — L57.8 OTHER SKIN CHANGES DUE TO CHRONIC EXPOSURE TO NONIONIZING RADIATION: ICD-10-CM

## 2023-06-21 DIAGNOSIS — L82.0 INFLAMED SEBORRHEIC KERATOSIS: ICD-10-CM

## 2023-06-21 DIAGNOSIS — L81.4 OTHER MELANIN HYPERPIGMENTATION: ICD-10-CM

## 2023-06-21 DIAGNOSIS — L82.1 OTHER SEBORRHEIC KERATOSIS: ICD-10-CM

## 2023-06-21 PROCEDURE — OTHER PRESCRIPTION MEDICATION MANAGEMENT: OTHER

## 2023-06-21 PROCEDURE — 99213 OFFICE O/P EST LOW 20 MIN: CPT | Mod: 25

## 2023-06-21 PROCEDURE — 17110 DESTRUCT B9 LESION 1-14: CPT

## 2023-06-21 PROCEDURE — OTHER COUNSELING: OTHER

## 2023-06-21 PROCEDURE — OTHER PRESCRIPTION: OTHER

## 2023-06-21 PROCEDURE — OTHER SUNSCREEN RECOMMENDATIONS: OTHER

## 2023-06-21 PROCEDURE — OTHER LIQUID NITROGEN: OTHER

## 2023-06-21 PROCEDURE — OTHER REASSURANCE: OTHER

## 2023-06-21 RX ORDER — HYDROQUINONE 4 %
CREAM (GRAM) TOPICAL
Qty: 1 | Refills: 6 | Status: ERX | COMMUNITY
Start: 2023-06-21

## 2023-06-21 ASSESSMENT — LOCATION ZONE DERM
LOCATION ZONE: HAND
LOCATION ZONE: ARM
LOCATION ZONE: TRUNK
LOCATION ZONE: FACE

## 2023-06-21 ASSESSMENT — LOCATION DETAILED DESCRIPTION DERM
LOCATION DETAILED: RIGHT RADIAL DORSAL HAND
LOCATION DETAILED: LEFT INFERIOR CENTRAL MALAR CHEEK
LOCATION DETAILED: SUPERIOR THORACIC SPINE
LOCATION DETAILED: LEFT MEDIAL BREAST 10-11:00 REGION
LOCATION DETAILED: LEFT POSTERIOR SHOULDER
LOCATION DETAILED: RIGHT SUPERIOR MEDIAL FOREHEAD
LOCATION DETAILED: LEFT MEDIAL BREAST 11-12:00 REGION

## 2023-06-21 ASSESSMENT — LOCATION SIMPLE DESCRIPTION DERM
LOCATION SIMPLE: RIGHT HAND
LOCATION SIMPLE: LEFT CHEEK
LOCATION SIMPLE: LEFT BREAST
LOCATION SIMPLE: RIGHT FOREHEAD
LOCATION SIMPLE: LEFT SHOULDER
LOCATION SIMPLE: UPPER BACK

## 2023-06-21 ASSESSMENT — PAIN INTENSITY VAS
HOW INTENSE IS YOUR PAIN 0 BEING NO PAIN, 10 BEING THE MOST SEVERE PAIN POSSIBLE?: 2/10 PAIN
HOW INTENSE IS YOUR PAIN 0 BEING NO PAIN, 10 BEING THE MOST SEVERE PAIN POSSIBLE?: NO PAIN

## 2023-06-21 NOTE — PROCEDURE: LIQUID NITROGEN
Consent: The patient's consent was obtained including but not limited to risks of crusting, scabbing, blistering, scarring, darker or lighter pigmentary change, recurrence, incomplete removal and infection.
Medical Necessity Information: It is in your best interest to select a reason for this procedure from the list below. All of these items fulfill various CMS LCD requirements except the new and changing color options.
Detail Level: Detailed
Show Spray Paint Technique Variable?: Yes
Render Post-Care Instructions In Note?: no
Spray Paint Text: The liquid nitrogen was applied to the skin utilizing a spray paint frosting technique.
Medical Necessity Clause: This procedure was medically necessary because the lesions that were treated were:
Post-Care Instructions: I reviewed with the patient in detail post-care instructions. Patient is to wear sunprotection, and avoid picking at any of the treated lesions. Pt may apply Vaseline to crusted or scabbing areas.

## 2023-06-23 ENCOUNTER — RX ONLY (RX ONLY)
Age: 68
End: 2023-06-23

## 2023-06-23 RX ORDER — HYDROQUINONE 4 %
CREAM (GRAM) TOPICAL
Qty: 1 | Refills: 6 | Status: ERX

## 2024-04-04 ENCOUNTER — APPOINTMENT (OUTPATIENT)
Dept: URBAN - METROPOLITAN AREA CLINIC 203 | Age: 69
Setting detail: DERMATOLOGY
End: 2024-04-04

## 2024-04-04 DIAGNOSIS — L57.8 OTHER SKIN CHANGES DUE TO CHRONIC EXPOSURE TO NONIONIZING RADIATION: ICD-10-CM

## 2024-04-04 PROCEDURE — OTHER SUNSCREEN RECOMMENDATIONS: OTHER

## 2024-04-04 PROCEDURE — 99212 OFFICE O/P EST SF 10 MIN: CPT

## 2024-04-04 PROCEDURE — OTHER COUNSELING: OTHER

## 2024-04-04 ASSESSMENT — LOCATION DETAILED DESCRIPTION DERM
LOCATION DETAILED: LEFT MEDIAL BREAST 11-12:00 REGION
LOCATION DETAILED: LEFT MEDIAL BREAST 10-11:00 REGION

## 2024-04-04 ASSESSMENT — LOCATION ZONE DERM: LOCATION ZONE: TRUNK

## 2024-04-04 ASSESSMENT — LOCATION SIMPLE DESCRIPTION DERM: LOCATION SIMPLE: LEFT BREAST

## 2025-04-08 ENCOUNTER — APPOINTMENT (OUTPATIENT)
Dept: URBAN - METROPOLITAN AREA CLINIC 203 | Age: 70
Setting detail: DERMATOLOGY
End: 2025-04-15

## 2025-04-08 DIAGNOSIS — L91.8 OTHER HYPERTROPHIC DISORDERS OF THE SKIN: ICD-10-CM

## 2025-04-08 DIAGNOSIS — D22 MELANOCYTIC NEVI: ICD-10-CM

## 2025-04-08 DIAGNOSIS — L57.8 OTHER SKIN CHANGES DUE TO CHRONIC EXPOSURE TO NONIONIZING RADIATION: ICD-10-CM

## 2025-04-08 DIAGNOSIS — D18.0 HEMANGIOMA: ICD-10-CM

## 2025-04-08 DIAGNOSIS — L81.4 OTHER MELANIN HYPERPIGMENTATION: ICD-10-CM

## 2025-04-08 DIAGNOSIS — L82.1 OTHER SEBORRHEIC KERATOSIS: ICD-10-CM

## 2025-04-08 PROBLEM — D22.61 MELANOCYTIC NEVI OF RIGHT UPPER LIMB, INCLUDING SHOULDER: Status: ACTIVE | Noted: 2025-04-08

## 2025-04-08 PROBLEM — D22.72 MELANOCYTIC NEVI OF LEFT LOWER LIMB, INCLUDING HIP: Status: ACTIVE | Noted: 2025-04-08

## 2025-04-08 PROBLEM — D18.01 HEMANGIOMA OF SKIN AND SUBCUTANEOUS TISSUE: Status: ACTIVE | Noted: 2025-04-08

## 2025-04-08 PROBLEM — D22.62 MELANOCYTIC NEVI OF LEFT UPPER LIMB, INCLUDING SHOULDER: Status: ACTIVE | Noted: 2025-04-08

## 2025-04-08 PROCEDURE — OTHER COUNSELING: OTHER

## 2025-04-08 PROCEDURE — OTHER SUNSCREEN RECOMMENDATIONS: OTHER

## 2025-04-08 PROCEDURE — 99213 OFFICE O/P EST LOW 20 MIN: CPT

## 2025-04-08 PROCEDURE — OTHER REASSURANCE: OTHER

## 2025-04-08 ASSESSMENT — LOCATION DETAILED DESCRIPTION DERM
LOCATION DETAILED: LEFT RADIAL DORSAL HAND
LOCATION DETAILED: RIGHT RADIAL DORSAL HAND
LOCATION DETAILED: LEFT ANTERIOR DISTAL THIGH
LOCATION DETAILED: LEFT MEDIAL BREAST 11-12:00 REGION
LOCATION DETAILED: LEFT MEDIAL BREAST 10-11:00 REGION
LOCATION DETAILED: LEFT ULNAR DORSAL HAND
LOCATION DETAILED: EPIGASTRIC SKIN
LOCATION DETAILED: RIGHT SUPERIOR CENTRAL MALAR CHEEK
LOCATION DETAILED: LEFT PROXIMAL PRETIBIAL REGION
LOCATION DETAILED: RIGHT INFERIOR UPPER BACK

## 2025-04-08 ASSESSMENT — LOCATION SIMPLE DESCRIPTION DERM
LOCATION SIMPLE: LEFT PRETIBIAL REGION
LOCATION SIMPLE: RIGHT UPPER BACK
LOCATION SIMPLE: ABDOMEN
LOCATION SIMPLE: LEFT BREAST
LOCATION SIMPLE: RIGHT HAND
LOCATION SIMPLE: RIGHT CHEEK
LOCATION SIMPLE: LEFT HAND
LOCATION SIMPLE: LEFT THIGH
LOCATION SIMPLE: LEFT HAND

## 2025-04-08 ASSESSMENT — LOCATION ZONE DERM
LOCATION ZONE: FACE
LOCATION ZONE: HAND
LOCATION ZONE: TRUNK
LOCATION ZONE: LEG
LOCATION ZONE: HAND